# Patient Record
Sex: MALE | Race: WHITE | NOT HISPANIC OR LATINO | Employment: FULL TIME | URBAN - METROPOLITAN AREA
[De-identification: names, ages, dates, MRNs, and addresses within clinical notes are randomized per-mention and may not be internally consistent; named-entity substitution may affect disease eponyms.]

---

## 2023-03-24 ENCOUNTER — TELEPHONE (OUTPATIENT)
Dept: PRIMARY CARE | Facility: CLINIC | Age: 68
End: 2023-03-24
Payer: MEDICARE

## 2023-03-24 NOTE — TELEPHONE ENCOUNTER
Patient's endocrinologist retired his next appointment with new endo doctor is in July.    Patient needs glucose monitor reordered asking if Dr. Irwin would be willing to do this until he is reestablished     Please call patient [phone]  [facility] [phone]

## 2023-04-11 PROBLEM — M17.0 ARTHRITIS OF BOTH KNEES: Status: ACTIVE | Noted: 2023-04-11

## 2023-04-11 PROBLEM — E66.3 OVERWEIGHT WITH BODY MASS INDEX (BMI) 25.0-29.9: Status: ACTIVE | Noted: 2023-04-11

## 2023-04-11 PROBLEM — M25.569 PAIN IN JOINT, LOWER LEG: Status: ACTIVE | Noted: 2023-04-11

## 2023-04-11 PROBLEM — E11.9 DIABETES MELLITUS TYPE 2 WITHOUT RETINOPATHY (MULTI): Status: ACTIVE | Noted: 2023-04-11

## 2023-04-11 PROBLEM — M10.9 GOUT: Status: ACTIVE | Noted: 2023-04-11

## 2023-04-11 PROBLEM — H52.209 HYPEROPIA WITH ASTIGMATISM AND PRESBYOPIA: Status: ACTIVE | Noted: 2023-04-11

## 2023-04-11 PROBLEM — H52.00 HYPEROPIA WITH ASTIGMATISM AND PRESBYOPIA: Status: ACTIVE | Noted: 2023-04-11

## 2023-04-11 PROBLEM — H35.033 BILATERAL HYPERTENSIVE RETINOPATHY: Status: ACTIVE | Noted: 2023-04-11

## 2023-04-11 PROBLEM — R80.8 NEPHROGENOUS PROTEINURIA: Status: ACTIVE | Noted: 2023-04-11

## 2023-04-11 PROBLEM — J06.9 ACUTE UPPER RESPIRATORY INFECTION: Status: ACTIVE | Noted: 2023-04-11

## 2023-04-11 PROBLEM — I10 HYPERTENSION: Status: ACTIVE | Noted: 2023-04-11

## 2023-04-11 PROBLEM — H25.13 CATARACT, NUCLEAR SCLEROTIC, BOTH EYES: Status: ACTIVE | Noted: 2023-04-11

## 2023-04-11 PROBLEM — J01.90 ACUTE SINUSITIS: Status: ACTIVE | Noted: 2023-04-11

## 2023-04-11 PROBLEM — N40.0 BENIGN ENLARGEMENT OF PROSTATE: Status: ACTIVE | Noted: 2023-04-11

## 2023-04-11 PROBLEM — I35.0 AORTIC STENOSIS, MILD: Status: ACTIVE | Noted: 2023-04-11

## 2023-04-11 PROBLEM — K44.9 HIATAL HERNIA: Status: ACTIVE | Noted: 2023-04-11

## 2023-04-11 PROBLEM — G57.12 MERALGIA PARESTHETICA OF LEFT SIDE: Status: ACTIVE | Noted: 2023-04-11

## 2023-04-11 PROBLEM — H25.11 AGE-RELATED NUCLEAR CATARACT OF RIGHT EYE: Status: ACTIVE | Noted: 2023-04-11

## 2023-04-11 PROBLEM — J30.2 SEASONAL ALLERGIES: Status: ACTIVE | Noted: 2023-04-11

## 2023-04-11 PROBLEM — L03.115 CELLULITIS OF RIGHT LOWER EXTREMITY: Status: ACTIVE | Noted: 2023-04-11

## 2023-04-11 PROBLEM — M47.892 OTHER SPONDYLOSIS, CERVICAL REGION: Status: ACTIVE | Noted: 2023-04-11

## 2023-04-11 PROBLEM — H52.4 HYPEROPIA WITH ASTIGMATISM AND PRESBYOPIA: Status: ACTIVE | Noted: 2023-04-11

## 2023-04-11 PROBLEM — N52.9 MALE ERECTILE DISORDER OF ORGANIC ORIGIN: Status: ACTIVE | Noted: 2023-04-11

## 2023-04-11 PROBLEM — E11.9 DIABETES MELLITUS (MULTI): Status: ACTIVE | Noted: 2023-04-11

## 2023-04-11 PROBLEM — R04.0 EPISTAXIS: Status: ACTIVE | Noted: 2023-04-11

## 2023-04-11 PROBLEM — N28.1 CYST, KIDNEY, ACQUIRED: Status: ACTIVE | Noted: 2023-04-11

## 2023-04-11 PROBLEM — N05.1 FSGS (FOCAL SEGMENTAL GLOMERULOSCLEROSIS): Status: ACTIVE | Noted: 2023-04-11

## 2023-04-11 PROBLEM — K21.9 ESOPHAGEAL REFLUX: Status: ACTIVE | Noted: 2023-04-11

## 2023-04-11 PROBLEM — M70.20 OLECRANON BURSITIS: Status: ACTIVE | Noted: 2023-04-11

## 2023-04-11 PROBLEM — K57.90 DIVERTICULOSIS: Status: ACTIVE | Noted: 2023-04-11

## 2023-04-11 PROBLEM — G47.33 OBSTRUCTIVE SLEEP APNEA ON CPAP: Status: ACTIVE | Noted: 2023-04-11

## 2023-04-11 PROBLEM — E78.5 HYPERLIPIDEMIA: Status: ACTIVE | Noted: 2023-04-11

## 2023-04-11 PROBLEM — H25.12 AGE-RELATED NUCLEAR CATARACT OF LEFT EYE: Status: ACTIVE | Noted: 2023-04-11

## 2023-04-11 PROBLEM — U07.1 COVID-19 VIRUS DETECTED: Status: ACTIVE | Noted: 2023-04-11

## 2023-04-11 PROBLEM — H02.889 MGD (MEIBOMIAN GLAND DISEASE): Status: ACTIVE | Noted: 2023-04-11

## 2023-04-11 RX ORDER — ERGOCALCIFEROL 1.25 MG/1
1 CAPSULE ORAL
COMMUNITY
Start: 2023-02-15

## 2023-04-11 RX ORDER — AMLODIPINE BESYLATE 5 MG/1
1 TABLET ORAL DAILY
COMMUNITY
Start: 2023-02-21 | End: 2023-10-18 | Stop reason: WASHOUT

## 2023-04-11 RX ORDER — METFORMIN HYDROCHLORIDE 500 MG/1
1000 TABLET ORAL
COMMUNITY
End: 2023-11-07 | Stop reason: SDUPTHER

## 2023-04-11 RX ORDER — CHLORTHALIDONE 25 MG/1
1 TABLET ORAL DAILY
COMMUNITY

## 2023-04-11 RX ORDER — AMLODIPINE BESYLATE 10 MG/1
1 TABLET ORAL DAILY
COMMUNITY
End: 2023-04-12 | Stop reason: ALTCHOICE

## 2023-04-11 RX ORDER — CYCLOBENZAPRINE HCL 10 MG
TABLET ORAL EVERY 8 HOURS PRN
COMMUNITY
Start: 2022-03-15

## 2023-04-11 RX ORDER — AMOXICILLIN 500 MG/1
CAPSULE ORAL
COMMUNITY
Start: 2022-10-27 | End: 2024-04-25 | Stop reason: ALTCHOICE

## 2023-04-11 RX ORDER — BLOOD SUGAR DIAGNOSTIC
STRIP MISCELLANEOUS
COMMUNITY
Start: 2016-08-17

## 2023-04-11 RX ORDER — SIMVASTATIN 80 MG/1
TABLET, FILM COATED ORAL
COMMUNITY
End: 2023-10-17

## 2023-04-11 RX ORDER — LISINOPRIL 20 MG/1
20 TABLET ORAL DAILY
COMMUNITY
End: 2023-10-18 | Stop reason: ALTCHOICE

## 2023-04-11 RX ORDER — ASPIRIN 81 MG/1
TABLET ORAL
COMMUNITY
Start: 2016-11-02 | End: 2023-04-13 | Stop reason: DRUGHIGH

## 2023-04-11 RX ORDER — INSULIN GLARGINE 100 [IU]/ML
INJECTION, SOLUTION SUBCUTANEOUS
COMMUNITY

## 2023-04-12 ENCOUNTER — OFFICE VISIT (OUTPATIENT)
Dept: PRIMARY CARE | Facility: CLINIC | Age: 68
End: 2023-04-12
Payer: MEDICARE

## 2023-04-12 VITALS
WEIGHT: 186 LBS | SYSTOLIC BLOOD PRESSURE: 118 MMHG | HEART RATE: 76 BPM | OXYGEN SATURATION: 98 % | DIASTOLIC BLOOD PRESSURE: 71 MMHG | BODY MASS INDEX: 27.07 KG/M2 | TEMPERATURE: 98.2 F | RESPIRATION RATE: 16 BRPM

## 2023-04-12 DIAGNOSIS — D68.51 HOMOZYGOUS FACTOR V LEIDEN MUTATION (MULTI): Chronic | ICD-10-CM

## 2023-04-12 DIAGNOSIS — I10 PRIMARY HYPERTENSION: Chronic | ICD-10-CM

## 2023-04-12 DIAGNOSIS — J30.2 SEASONAL ALLERGIES: Chronic | ICD-10-CM

## 2023-04-12 DIAGNOSIS — G47.30 SLEEP APNEA, UNSPECIFIED TYPE: Chronic | ICD-10-CM

## 2023-04-12 DIAGNOSIS — M25.562 CHRONIC PAIN OF LEFT KNEE: Chronic | ICD-10-CM

## 2023-04-12 DIAGNOSIS — N18.32: Chronic | ICD-10-CM

## 2023-04-12 DIAGNOSIS — E13.22: Chronic | ICD-10-CM

## 2023-04-12 DIAGNOSIS — G47.33 OBSTRUCTIVE SLEEP APNEA ON CPAP: Chronic | ICD-10-CM

## 2023-04-12 DIAGNOSIS — H25.9 AGE-RELATED CATARACT OF BOTH EYES, UNSPECIFIED AGE-RELATED CATARACT TYPE: Chronic | ICD-10-CM

## 2023-04-12 DIAGNOSIS — G89.29 CHRONIC PAIN OF LEFT KNEE: Chronic | ICD-10-CM

## 2023-04-12 DIAGNOSIS — N40.1 BPH ASSOCIATED WITH NOCTURIA: Chronic | ICD-10-CM

## 2023-04-12 DIAGNOSIS — Z00.00 ROUTINE GENERAL MEDICAL EXAMINATION AT HEALTH CARE FACILITY: Primary | ICD-10-CM

## 2023-04-12 DIAGNOSIS — Z13.6 SCREENING FOR CARDIOVASCULAR CONDITION: ICD-10-CM

## 2023-04-12 DIAGNOSIS — R35.1 BPH ASSOCIATED WITH NOCTURIA: Chronic | ICD-10-CM

## 2023-04-12 DIAGNOSIS — R80.8 NEPHROGENOUS PROTEINURIA: Chronic | ICD-10-CM

## 2023-04-12 DIAGNOSIS — R35.1 BENIGN PROSTATIC HYPERPLASIA WITH NOCTURIA: ICD-10-CM

## 2023-04-12 DIAGNOSIS — N05.1 FSGS (FOCAL SEGMENTAL GLOMERULOSCLEROSIS): Chronic | ICD-10-CM

## 2023-04-12 DIAGNOSIS — E21.3 HYPERPARATHYROIDISM (MULTI): Chronic | ICD-10-CM

## 2023-04-12 DIAGNOSIS — Z71.85 IMMUNIZATION COUNSELING: ICD-10-CM

## 2023-04-12 DIAGNOSIS — Z79.4: Chronic | ICD-10-CM

## 2023-04-12 DIAGNOSIS — N40.1 BENIGN PROSTATIC HYPERPLASIA WITH NOCTURIA: ICD-10-CM

## 2023-04-12 DIAGNOSIS — E78.5 DYSLIPIDEMIA, GOAL LDL BELOW 70: Chronic | ICD-10-CM

## 2023-04-12 DIAGNOSIS — M1A.39X0 CHRONIC GOUT DUE TO RENAL IMPAIRMENT OF MULTIPLE SITES WITHOUT TOPHUS: Chronic | ICD-10-CM

## 2023-04-12 DIAGNOSIS — M77.11 EPICONDYLITIS, LATERAL, RIGHT: Chronic | ICD-10-CM

## 2023-04-12 PROCEDURE — 1036F TOBACCO NON-USER: CPT | Performed by: INTERNAL MEDICINE

## 2023-04-12 PROCEDURE — G0439 PPPS, SUBSEQ VISIT: HCPCS | Performed by: INTERNAL MEDICINE

## 2023-04-12 PROCEDURE — 3078F DIAST BP <80 MM HG: CPT | Performed by: INTERNAL MEDICINE

## 2023-04-12 PROCEDURE — 1170F FXNL STATUS ASSESSED: CPT | Performed by: INTERNAL MEDICINE

## 2023-04-12 PROCEDURE — 4010F ACE/ARB THERAPY RXD/TAKEN: CPT | Performed by: INTERNAL MEDICINE

## 2023-04-12 PROCEDURE — 1159F MED LIST DOCD IN RCRD: CPT | Performed by: INTERNAL MEDICINE

## 2023-04-12 PROCEDURE — G0009 ADMIN PNEUMOCOCCAL VACCINE: HCPCS | Performed by: INTERNAL MEDICINE

## 2023-04-12 PROCEDURE — 90677 PCV20 VACCINE IM: CPT | Performed by: INTERNAL MEDICINE

## 2023-04-12 PROCEDURE — 1160F RVW MEDS BY RX/DR IN RCRD: CPT | Performed by: INTERNAL MEDICINE

## 2023-04-12 PROCEDURE — 3066F NEPHROPATHY DOC TX: CPT | Performed by: INTERNAL MEDICINE

## 2023-04-12 PROCEDURE — 3074F SYST BP LT 130 MM HG: CPT | Performed by: INTERNAL MEDICINE

## 2023-04-12 PROCEDURE — 99397 PER PM REEVAL EST PAT 65+ YR: CPT | Performed by: INTERNAL MEDICINE

## 2023-04-12 RX ORDER — ALLOPURINOL 100 MG/1
100 TABLET ORAL DAILY
COMMUNITY

## 2023-04-12 ASSESSMENT — PATIENT HEALTH QUESTIONNAIRE - PHQ9
SUM OF ALL RESPONSES TO PHQ9 QUESTIONS 1 AND 2: 0
2. FEELING DOWN, DEPRESSED OR HOPELESS: NOT AT ALL
1. LITTLE INTEREST OR PLEASURE IN DOING THINGS: NOT AT ALL

## 2023-04-12 ASSESSMENT — ENCOUNTER SYMPTOMS
LOSS OF SENSATION IN FEET: 0
DEPRESSION: 0
OCCASIONAL FEELINGS OF UNSTEADINESS: 0

## 2023-04-12 NOTE — PROGRESS NOTES
Subjective   Patient ID: Daphne Andrew is a 67 y.o. male who presents for Welcome To [employer].    Here for wellness visit and follow-up  Overall doing well.  Left knee has been sore-he has been into his rheumatologist to get this injected  Right elbow sore recently         Review of Systems    Objective   /71 (BP Location: Left arm, Patient Position: Sitting)   Pulse 76   Temp 36.8 °C (98.2 °F)   Resp 16   Wt 84.4 kg (186 lb)   SpO2 98%   BMI 27.07 kg/m²     Physical Exam  Constitutional:       Appearance: Normal appearance.   HENT:      Head: Normocephalic and atraumatic.      Right Ear: Tympanic membrane normal.      Nose: Nose normal.   Eyes:      General: No scleral icterus.     Extraocular Movements: Extraocular movements intact.      Conjunctiva/sclera: Conjunctivae normal.      Pupils: Pupils are equal, round, and reactive to light.   Cardiovascular:      Rate and Rhythm: Normal rate and regular rhythm.      Pulses: Normal pulses.      Heart sounds: Murmur heard.   Pulmonary:      Effort: Pulmonary effort is normal. No respiratory distress.      Breath sounds: Normal breath sounds. No stridor. No wheezing.   Abdominal:      General: Abdomen is flat. Bowel sounds are normal. There is no distension.      Palpations: Abdomen is soft. There is no mass.      Tenderness: There is no abdominal tenderness. There is no guarding.   Musculoskeletal:         General: No swelling, tenderness or deformity. Normal range of motion.      Cervical back: Normal range of motion and neck supple. No tenderness.   Lymphadenopathy:      Cervical: No cervical adenopathy.   Skin:     General: Skin is warm and dry.      Findings: No lesion or rash.   Neurological:      General: No focal deficit present.      Mental Status: He is alert and oriented to person, place, and time.      Cranial Nerves: No cranial nerve deficit.      Motor: No weakness.   Psychiatric:         Mood and Affect: Mood normal.         Behavior: Behavior  normal.         Thought Content: Thought content normal.         Judgment: Judgment normal.         Assessment/Plan   Problem List Items Addressed This Visit          Nervous    Obstructive sleep apnea on CPAP       Circulatory    Hypertension       Genitourinary    Benign enlargement of prostate    FSGS (focal segmental glomerulosclerosis)       Endocrine/Metabolic    Diabetes mellitus (CMS/HCC)    Relevant Orders    Referral to Ophthalmology    Hyperparathyroidism (CMS/HCC)       Hematologic    Homozygous Factor V Leiden mutation (CMS/HCC)    Relevant Medications    aspirin 81 mg EC tablet       Other    Gout    Nephrogenous proteinuria    Seasonal allergies     Other Visit Diagnoses       Routine general medical examination at health care facility    -  Primary    Immunization counseling        Relevant Medications    zoster vaccine-recombinant adjuvanted (Shingrix) 50 mcg/0.5 mL vaccine    Other Relevant Orders    Pneumococcal conjugate vaccine, 20-valent, adult (PREVNAR 20) (Completed)    Age-related cataract of both eyes, unspecified age-related cataract type  (Chronic)       Relevant Orders    Referral to Ophthalmology    Sleep apnea, unspecified type  (Chronic)       Relevant Orders    Referral to Adult Sleep Medicine    Dyslipidemia, goal LDL below 70  (Chronic)       Relevant Orders    Lipid Panel    TSH with reflex to Free T4 if abnormal    Alanine Aminotransferase    BPH associated with nocturia  (Chronic)       Relevant Orders    Prostate Specific Antigen    Screening for cardiovascular condition        Chronic pain of left knee  (Chronic)       Epicondylitis, lateral, right  (Chronic)                    living situation - he lives in a 2 story home, w/ bedroom upstairs. His home in [place] has 5 stories, now w/ an elevator        Labile insulin-dependent diabetes/elevated weight-obviously weight loss important. He will follow-up with endocrinology.  Presently using the Dexicon 6 continuous glucose  monitoring-improved control with better diet selections with immediate feedback            He meets with Dr. Reid 7/12/2023 to establish with her.  Continuous glucose monitor supply form signed/faxed     elevated weight/dyslipidemia- he will continue his statin and weight loss efforts. HDL 41, LDL 97, total cholesterol 163 July '22.     Fasting labs ordered for this year            Hypertension/edema-he will continue his medication low-salt diet and weight loss efforts     Mild aortic stenosis- noted on echo '17; 2 year follow-up echo 11/19 stable will consider another echo in 2 years- 11/21        FSGS secondary- proteinuria- -status post renal biopsy-he will follow-up with Dr. Richard Pierre; recent visit OK he notes. No edema today.    Appointment in February 2022 with Dr. Richard Pierre- every 6 months- more frequent w/ renal lab concerns. Creatinine 1.9 in August 2021.             February 2023-creatinine 2.14.  He will continue to see him every 6 months-February and August    Hyperparathyroidism-secondary-he will continue to manage with his nephrologist    Vitamin D deficiency- encouraged patient to continue vitamin D daily as ordered. Will consider vitamin D level regularly.      Factor V Leiden homozygous trait-discovered after his son's death from DVTs and PE-coronary requested the entire family be screened for hereditary thrombophilia-patient saw Dr. Hutchinson 11/22-further testing revealed homozygous trait for factor V Leiden-baby aspirin daily recommended as well as consideration for anticoagulation after any elective surgeries        BPH/ED/renal cyst-status post urology consultation with Dr. Horne in the past. PSA normal Jul '22. Ordered for this summer     Colon cancer screening- colonoscopy Updated 11/13; next rec'd 10 yrs - 11/23     Bilateral moderate knee arthritis-noted on x-rays-January 2021- knee pain ongoing especially kneeling on his knees problematic presently.              He has worked with several  orthopedist.  Most recently he met with Dr. Martinez who did an injection in the left knee which helped    Right lateral epicondylitis-information sheet provided stretching and brace encouraged to follow-up if not improved     Right foot pain and swelling/Right ankle Charcot deformity-improved wearing a boot earlier in 2019 per Dr. Mayorga/podiatry. He will see her as needed Coincidentally, recurred again months later. His only doing better. Dr. Diane Mayorga/podiatry. He will elevate the foot continue limited activity and follow-up with her if symptoms persist               Improved presently     Epistaxis - improved     Obstructive sleep apnea on C Pap - He'll f/u w/ Dr. Johnson in pulmonary as directed. He admits he only does this about 2 nights/ wk.         He's not really using this at all. Encouraged follow-up.  He will meet with the sleep team     Acid reflux- . He's found esomeprazole helpful. However at this point he stopped this and presently uses Tums occasionally   Improved presently.        Seasonal allergies-he will continue seasonally as needed     Dental visit-encouraged semiannually.      Cataracts / Vision care-he will continue annually with eye clinic -last vision appointment Sep '21 per Dr. Beltran- he retired             He will establish with a new eye doctor at our eye clinic to check cataracts and do annual diabetic eye exam                 bereavement - Patient lost his youngest son on 6/5/22-bilateral DVTs and PE.  After further hematology testing of the whole family, it turns out that Coni was  homozygous for the factor V Leiden trait.     Flu shot encouraged each fall     Prevnar 13-updated 4/12/2023-today     discussed Shingrix / new shingles shot - 2 shot series w/ limited availability. Encouraged patient to consider getting this when/ where available.     Follow-up in semiannually, sooner as needed

## 2023-04-13 PROBLEM — E21.3 HYPERPARATHYROIDISM (MULTI): Status: ACTIVE | Noted: 2023-04-13

## 2023-04-13 PROBLEM — U07.1 COVID-19 VIRUS DETECTED: Status: RESOLVED | Noted: 2023-04-11 | Resolved: 2023-04-13

## 2023-04-13 PROBLEM — E55.9 VITAMIN D DEFICIENCY: Status: ACTIVE | Noted: 2023-04-13

## 2023-04-13 PROBLEM — N18.30 STAGE 3 CHRONIC KIDNEY DISEASE (MULTI): Status: ACTIVE | Noted: 2023-04-13

## 2023-04-13 RX ORDER — TADALAFIL 20 MG/1
20 TABLET ORAL DAILY PRN
COMMUNITY

## 2023-04-13 RX ORDER — INSULIN LISPRO 100 [IU]/ML
10 INJECTION, SUSPENSION SUBCUTANEOUS 3 TIMES DAILY PRN
COMMUNITY

## 2023-04-13 RX ORDER — CETIRIZINE HYDROCHLORIDE 10 MG/1
10 TABLET ORAL DAILY PRN
COMMUNITY

## 2023-04-13 RX ORDER — ASPIRIN 81 MG/1
81 TABLET ORAL DAILY
Status: SHIPPED | COMMUNITY
Start: 2023-04-13

## 2023-04-13 ASSESSMENT — ACTIVITIES OF DAILY LIVING (ADL)
ASSISTIVE_DEVICE: EYEGLASSES
TOILETING: INDEPENDENT
BATHING: INDEPENDENT
HEARING - RIGHT EAR: FUNCTIONAL
GROOMING: INDEPENDENT
ADEQUATE_TO_COMPLETE_ADL: YES
JUDGMENT_ADEQUATE_SAFELY_COMPLETE_DAILY_ACTIVITIES: YES
PATIENT'S MEMORY ADEQUATE TO SAFELY COMPLETE DAILY ACTIVITIES?: YES
HEARING - LEFT EAR: FUNCTIONAL
DRESSING YOURSELF: INDEPENDENT
WALKS IN HOME: INDEPENDENT
FEEDING YOURSELF: INDEPENDENT

## 2023-04-14 PROBLEM — D68.51 HOMOZYGOUS FACTOR V LEIDEN MUTATION (MULTI): Status: ACTIVE | Noted: 2023-04-14

## 2023-06-29 ENCOUNTER — TELEPHONE (OUTPATIENT)
Dept: PRIMARY CARE | Facility: CLINIC | Age: 68
End: 2023-06-29
Payer: MEDICARE

## 2023-06-29 NOTE — TELEPHONE ENCOUNTER
Pt was released [facility] yesterday and would like to be seen or talk to Dr. Irwin. Pt's wife advised he is still having stomach pains. Please call and advise

## 2023-06-30 ENCOUNTER — PATIENT OUTREACH (OUTPATIENT)
Dept: PRIMARY CARE | Facility: CLINIC | Age: 68
End: 2023-06-30
Payer: MEDICARE

## 2023-06-30 DIAGNOSIS — A04.5 CAMPYLOBACTER GASTROENTERITIS: ICD-10-CM

## 2023-06-30 NOTE — TELEPHONE ENCOUNTER
Called patient's phone number-as well as his wife's cell phone number-neither there.  [employer] message sent

## 2023-06-30 NOTE — PROGRESS NOTES
TCM complete.  Discharge date 6/28/23   2 attempts were made to reach patient to assess needs.   No return call as of this note.   If patient schedules follow up within 14 days of discharge, visit is TCM billable.  Message sent to practice clinical pool to reach out to patient and schedule an appointment within 7-13 days from discharge date.    If patient meets criteria for moderately complex & has follow-up within 14 days-can bill [id number].   If patient meets criteria for highly complex & has follow-up visit within 7 days-can bill [id number].    *virtual follow up needs modifier added (95 or GT)   *AWV AND TCM CAN BE BILLED TOGETHER WITH 25 MODIFIER

## 2023-07-10 NOTE — TELEPHONE ENCOUNTER
Called and spoke with patient who states that he feels much better.  He went to a wedding out of town and came back with an infection with his kidney.  He has been working with his kidney specialist and doing blood and urine test every 2 weeks just there several days ago.  No active signs of infection.  He feels well and at this point does not have any additional concerns.  He will follow-up with me as scheduled in October.  He will continue to follow with his nephrologist regularly with the labs every 2 weeks.  He will call with concerns otherwise follow-up as scheduled

## 2023-07-11 DIAGNOSIS — E11.9 DIABETES MELLITUS TYPE 2 WITHOUT RETINOPATHY (MULTI): Primary | ICD-10-CM

## 2023-07-12 LAB — HEMOGLOBIN A1C/HEMOGLOBIN TOTAL IN BLOOD: 6 %

## 2023-07-14 ENCOUNTER — PATIENT OUTREACH (OUTPATIENT)
Dept: PRIMARY CARE | Facility: CLINIC | Age: 68
End: 2023-07-14
Payer: MEDICARE

## 2023-07-14 NOTE — PROGRESS NOTES
Unable to reach patient for call back 14 days post discharge from the hospital.  M with call back number for patient to call if needed   If no voicemail available call attempts x 2 were made to contact the patient to assist with any questions or concerns patient may have. Patient did not follow up with their PCP within that time. Patient dis-enrolled from [facility] this date.

## 2023-09-05 PROBLEM — E86.1 HYPOVOLEMIA: Status: ACTIVE | Noted: 2023-09-05

## 2023-09-05 PROBLEM — N17.9 AKI (ACUTE KIDNEY INJURY) (CMS-HCC): Status: ACTIVE | Noted: 2023-09-05

## 2023-09-05 PROBLEM — E79.0 HYPERURICEMIA: Status: ACTIVE | Noted: 2023-09-05

## 2023-09-05 PROBLEM — M25.562 CHRONIC PAIN OF BOTH KNEES: Status: ACTIVE | Noted: 2023-09-05

## 2023-09-05 PROBLEM — R19.7 WATERY DIARRHEA: Status: ACTIVE | Noted: 2023-09-05

## 2023-09-05 PROBLEM — A04.5 CAMPYLOBACTER GASTROENTERITIS: Status: ACTIVE | Noted: 2023-09-05

## 2023-09-05 PROBLEM — M25.561 CHRONIC PAIN OF BOTH KNEES: Status: ACTIVE | Noted: 2023-09-05

## 2023-09-05 PROBLEM — G89.29 CHRONIC PAIN OF BOTH KNEES: Status: ACTIVE | Noted: 2023-09-05

## 2023-09-05 RX ORDER — ACETAMINOPHEN 325 MG/1
650 TABLET ORAL
COMMUNITY

## 2023-09-05 RX ORDER — AMLODIPINE BESYLATE 10 MG/1
1 TABLET ORAL DAILY
COMMUNITY
End: 2024-02-07 | Stop reason: WASHOUT

## 2023-09-05 RX ORDER — METHYLPREDNISOLONE 4 MG/1
TABLET ORAL
COMMUNITY
Start: 2023-07-05 | End: 2024-04-25 | Stop reason: ALTCHOICE

## 2023-09-05 RX ORDER — LISINOPRIL 40 MG/1
40 TABLET ORAL DAILY
COMMUNITY
End: 2024-02-07 | Stop reason: WASHOUT

## 2023-09-05 RX ORDER — METFORMIN HYDROCHLORIDE 1000 MG/1
1000 TABLET ORAL
COMMUNITY
End: 2023-11-07 | Stop reason: WASHOUT

## 2023-09-05 RX ORDER — ASPIRIN 325 MG
50000 TABLET, DELAYED RELEASE (ENTERIC COATED) ORAL
COMMUNITY

## 2023-09-05 RX ORDER — PEN NEEDLE, DIABETIC 30 GX3/16"
NEEDLE, DISPOSABLE MISCELLANEOUS DAILY
COMMUNITY

## 2023-09-05 RX ORDER — DULAGLUTIDE 0.75 MG/.5ML
0.75 INJECTION, SOLUTION SUBCUTANEOUS
COMMUNITY
Start: 2023-07-12 | End: 2023-10-02 | Stop reason: SDUPTHER

## 2023-10-02 ENCOUNTER — TELEPHONE (OUTPATIENT)
Dept: ENDOCRINOLOGY | Facility: CLINIC | Age: 68
End: 2023-10-02
Payer: MEDICARE

## 2023-10-02 DIAGNOSIS — E11.9 DIABETES MELLITUS TYPE 2 WITHOUT RETINOPATHY (MULTI): Primary | ICD-10-CM

## 2023-10-02 RX ORDER — DULAGLUTIDE 0.75 MG/.5ML
0.75 INJECTION, SOLUTION SUBCUTANEOUS
Qty: 2 ML | Refills: 0 | Status: SHIPPED | OUTPATIENT
Start: 2023-10-02 | End: 2023-11-06

## 2023-10-11 ENCOUNTER — OFFICE VISIT (OUTPATIENT)
Dept: ENDOCRINOLOGY | Facility: CLINIC | Age: 68
End: 2023-10-11
Payer: MEDICARE

## 2023-10-11 VITALS
BODY MASS INDEX: 28.53 KG/M2 | SYSTOLIC BLOOD PRESSURE: 139 MMHG | RESPIRATION RATE: 16 BRPM | HEART RATE: 66 BPM | WEIGHT: 192.6 LBS | DIASTOLIC BLOOD PRESSURE: 84 MMHG | HEIGHT: 69 IN

## 2023-10-11 DIAGNOSIS — E11.9 DIABETES MELLITUS TYPE 2 WITHOUT RETINOPATHY (MULTI): ICD-10-CM

## 2023-10-11 DIAGNOSIS — Z97.8 USES SELF-APPLIED CONTINUOUS GLUCOSE MONITORING DEVICE: Primary | ICD-10-CM

## 2023-10-11 DIAGNOSIS — N18.31 STAGE 3A CHRONIC KIDNEY DISEASE (MULTI): ICD-10-CM

## 2023-10-11 LAB
POC FINGERSTICK BLOOD GLUCOSE: 142 MG/DL (ref 70–100)
POC HEMOGLOBIN A1C: 6.2 % (ref 4.2–6.5)

## 2023-10-11 PROCEDURE — 1036F TOBACCO NON-USER: CPT | Performed by: STUDENT IN AN ORGANIZED HEALTH CARE EDUCATION/TRAINING PROGRAM

## 2023-10-11 PROCEDURE — 3044F HG A1C LEVEL LT 7.0%: CPT | Performed by: STUDENT IN AN ORGANIZED HEALTH CARE EDUCATION/TRAINING PROGRAM

## 2023-10-11 PROCEDURE — 1160F RVW MEDS BY RX/DR IN RCRD: CPT | Performed by: STUDENT IN AN ORGANIZED HEALTH CARE EDUCATION/TRAINING PROGRAM

## 2023-10-11 PROCEDURE — 3066F NEPHROPATHY DOC TX: CPT | Performed by: STUDENT IN AN ORGANIZED HEALTH CARE EDUCATION/TRAINING PROGRAM

## 2023-10-11 PROCEDURE — 3075F SYST BP GE 130 - 139MM HG: CPT | Performed by: STUDENT IN AN ORGANIZED HEALTH CARE EDUCATION/TRAINING PROGRAM

## 2023-10-11 PROCEDURE — 83036 HEMOGLOBIN GLYCOSYLATED A1C: CPT | Performed by: STUDENT IN AN ORGANIZED HEALTH CARE EDUCATION/TRAINING PROGRAM

## 2023-10-11 PROCEDURE — 82962 GLUCOSE BLOOD TEST: CPT | Performed by: STUDENT IN AN ORGANIZED HEALTH CARE EDUCATION/TRAINING PROGRAM

## 2023-10-11 PROCEDURE — 4010F ACE/ARB THERAPY RXD/TAKEN: CPT | Performed by: STUDENT IN AN ORGANIZED HEALTH CARE EDUCATION/TRAINING PROGRAM

## 2023-10-11 PROCEDURE — 3079F DIAST BP 80-89 MM HG: CPT | Performed by: STUDENT IN AN ORGANIZED HEALTH CARE EDUCATION/TRAINING PROGRAM

## 2023-10-11 PROCEDURE — 95251 CONT GLUC MNTR ANALYSIS I&R: CPT | Performed by: STUDENT IN AN ORGANIZED HEALTH CARE EDUCATION/TRAINING PROGRAM

## 2023-10-11 PROCEDURE — 99214 OFFICE O/P EST MOD 30 MIN: CPT | Performed by: STUDENT IN AN ORGANIZED HEALTH CARE EDUCATION/TRAINING PROGRAM

## 2023-10-11 PROCEDURE — 1159F MED LIST DOCD IN RCRD: CPT | Performed by: STUDENT IN AN ORGANIZED HEALTH CARE EDUCATION/TRAINING PROGRAM

## 2023-10-11 ASSESSMENT — ENCOUNTER SYMPTOMS: CONSTITUTIONAL NEGATIVE: 1

## 2023-10-11 NOTE — PROGRESS NOTES
Subjective   Patient ID: Daphne Andrew is a 68 y.o. male who presents for for DM follow up    Follow up for diabetes   Last glucose on 6/28 was 180  Todays Glucose 142  Last A1c on 6/28 6.0  Todays A1c 6.2    Dexcom downloads reviewed  Range 83 % , high 16 % , very high <1   He is now off lantus   He just returned from oversees trip    Review of Systems   Constitutional: Negative.        Objective   Physical Exam  Constitutional:       Appearance: Normal appearance.   Cardiovascular:      Rate and Rhythm: Normal rate and regular rhythm.   Pulmonary:      Effort: Pulmonary effort is normal.      Breath sounds: Normal breath sounds.   Neurological:      General: No focal deficit present.      Mental Status: He is alert.         Assessment/Plan     -Well-controlled type 2 diabetes with A1c of 6.2  on metformin and GLP1 . He weaned off lantus after starting GLP1    -DM nephropathy follows with Dr. Ramirez  -no DM neuropathy   -follows with opthalmology yearly.    plan :  Continue  Trulicity 0.75 mg .( Mounjaro sample given 2.5 mg )    Continue metformin 500 mg twice daily ( maximum dose with CKD)  Continue CGM monitor      RTC in 3-4 months

## 2023-10-17 DIAGNOSIS — E78.5 DYSLIPIDEMIA, GOAL LDL BELOW 70: Primary | ICD-10-CM

## 2023-10-17 RX ORDER — SIMVASTATIN 80 MG/1
80 TABLET, FILM COATED ORAL NIGHTLY
Qty: 90 TABLET | Refills: 3 | Status: SHIPPED | OUTPATIENT
Start: 2023-10-17 | End: 2024-10-16

## 2023-10-18 ENCOUNTER — OFFICE VISIT (OUTPATIENT)
Dept: PRIMARY CARE | Facility: CLINIC | Age: 68
End: 2023-10-18
Payer: MEDICARE

## 2023-10-18 DIAGNOSIS — Z23 NEED FOR INFLUENZA VACCINATION: ICD-10-CM

## 2023-10-18 DIAGNOSIS — K21.9 GASTROESOPHAGEAL REFLUX DISEASE WITHOUT ESOPHAGITIS: ICD-10-CM

## 2023-10-18 DIAGNOSIS — Z12.11 COLON CANCER SCREENING: Primary | ICD-10-CM

## 2023-10-18 DIAGNOSIS — I10 PRIMARY HYPERTENSION: ICD-10-CM

## 2023-10-18 PROCEDURE — 1036F TOBACCO NON-USER: CPT | Performed by: INTERNAL MEDICINE

## 2023-10-18 PROCEDURE — 3075F SYST BP GE 130 - 139MM HG: CPT | Performed by: INTERNAL MEDICINE

## 2023-10-18 PROCEDURE — 3066F NEPHROPATHY DOC TX: CPT | Performed by: INTERNAL MEDICINE

## 2023-10-18 PROCEDURE — G0008 ADMIN INFLUENZA VIRUS VAC: HCPCS | Performed by: INTERNAL MEDICINE

## 2023-10-18 PROCEDURE — 1160F RVW MEDS BY RX/DR IN RCRD: CPT | Performed by: INTERNAL MEDICINE

## 2023-10-18 PROCEDURE — 3044F HG A1C LEVEL LT 7.0%: CPT | Performed by: INTERNAL MEDICINE

## 2023-10-18 PROCEDURE — 99214 OFFICE O/P EST MOD 30 MIN: CPT | Performed by: INTERNAL MEDICINE

## 2023-10-18 PROCEDURE — 3079F DIAST BP 80-89 MM HG: CPT | Performed by: INTERNAL MEDICINE

## 2023-10-18 PROCEDURE — 90662 IIV NO PRSV INCREASED AG IM: CPT | Performed by: INTERNAL MEDICINE

## 2023-10-18 PROCEDURE — 4010F ACE/ARB THERAPY RXD/TAKEN: CPT | Performed by: INTERNAL MEDICINE

## 2023-10-18 PROCEDURE — 1159F MED LIST DOCD IN RCRD: CPT | Performed by: INTERNAL MEDICINE

## 2023-10-18 RX ORDER — METOPROLOL SUCCINATE 50 MG/1
50 TABLET, EXTENDED RELEASE ORAL DAILY
Qty: 90 TABLET | Refills: 3 | Status: SHIPPED | OUTPATIENT
Start: 2023-10-18

## 2023-10-18 RX ORDER — FAMOTIDINE 40 MG/1
40 TABLET, FILM COATED ORAL DAILY PRN
Qty: 90 TABLET | Refills: 3 | Status: SHIPPED | OUTPATIENT
Start: 2023-10-18 | End: 2024-10-12

## 2023-10-18 ASSESSMENT — PATIENT HEALTH QUESTIONNAIRE - PHQ9
1. LITTLE INTEREST OR PLEASURE IN DOING THINGS: NOT AT ALL
SUM OF ALL RESPONSES TO PHQ9 QUESTIONS 1 AND 2: 0
2. FEELING DOWN, DEPRESSED OR HOPELESS: NOT AT ALL

## 2023-10-18 ASSESSMENT — ENCOUNTER SYMPTOMS
LOSS OF SENSATION IN FEET: 0
OCCASIONAL FEELINGS OF UNSTEADINESS: 0
DEPRESSION: 0

## 2023-10-18 NOTE — PROGRESS NOTES
"Subjective   Patient ID: Daphne Anderw is a 68 y.o. male who presents for Follow-up.    Here for follow-up.  For the most part doing well.  Left knee has been a bit sore-recently injected    Planning to see his nephrologist November 1 with labs before.  He did not do any labs before this visit.    No exertional chest pain, palpitations, dizziness, orthopnea or pedal edema.    He was able to go to see his family in [place] earlier this summer.  It was a good month he states         Review of Systems    Objective   /89 (BP Location: Left arm, Patient Position: Sitting, BP Cuff Size: Adult)   Pulse 64   Temp 36.6 °C (97.9 °F)   Resp 16   Ht 1.753 m (5' 9\")   Wt 86.2 kg (190 lb)   SpO2 99%   BMI 28.06 kg/m²     Physical Exam  Vitals reviewed.   Constitutional:       Appearance: Normal appearance.   HENT:      Head: Normocephalic and atraumatic.   Eyes:      General: No scleral icterus.        Right eye: No discharge.         Left eye: No discharge.      Extraocular Movements: Extraocular movements intact.      Conjunctiva/sclera: Conjunctivae normal.      Pupils: Pupils are equal, round, and reactive to light.   Cardiovascular:      Rate and Rhythm: Normal rate and regular rhythm.      Pulses: Normal pulses.      Heart sounds: Murmur heard.   Pulmonary:      Effort: Pulmonary effort is normal.      Breath sounds: Normal breath sounds. No wheezing or rhonchi.   Musculoskeletal:         General: No deformity or signs of injury. Normal range of motion.      Cervical back: Normal range of motion and neck supple. No rigidity or tenderness.      Right lower leg: Edema present.      Left lower leg: Edema present.   Lymphadenopathy:      Cervical: No cervical adenopathy.   Skin:     General: Skin is warm and dry.      Findings: No rash.   Neurological:      General: No focal deficit present.      Mental Status: He is alert and oriented to person, place, and time. Mental status is at baseline.      Cranial Nerves: " No cranial nerve deficit.      Sensory: No sensory deficit.      Gait: Gait normal.   Psychiatric:         Mood and Affect: Mood normal.         Behavior: Behavior normal.         Thought Content: Thought content normal.         Judgment: Judgment normal.         Assessment/Plan   Problem List Items Addressed This Visit    None  Visit Diagnoses         Codes    Need for influenza vaccination     Z23    Relevant Orders    Flu vaccine, quadrivalent, high-dose, preservative free, age 65y+ (FLUZONE)             living situation - he lives in a 2 story home, w/ bedroom upstairs. His home in [place] has 5 stories, now w/ an elevator        Labile insulin-dependent diabetes/elevated weight-obviously weight loss important. He will follow-up with endocrinology.  Presently using the Dexicon 6 continuous glucose monitoring-improved control with better diet selections with immediate feedback            Met with Dr. Reid in early Oct.  Continuous glucose monitor helpful.      elevated weight/dyslipidemia- he will continue his statin and weight loss efforts. HDL 41, LDL 97, total cholesterol 163 July '22.               Fasting labs ordered for this year.  He will do soon.            Hypertension/edema-he will continue his medication low-salt diet and weight loss efforts             10/23-systolic pressure elevated.  We will start low-dose metoprolol and review with his nephrologist at his appointment 11/1/2023.     Mild aortic stenosis- noted on echo '17; 2 year follow-up echo 11/19 stable           will consider another echo in 2 years- 11/21        FSGS secondary- proteinuria- -status post renal biopsy-he will follow-up with Dr. Richard Pierre; recent visit OK he notes. No edema today.    Appointment in February 2022 with Dr. Richard Pierre- every 6 months- more frequent w/ renal lab concerns. Creatinine 1.9 in August 2021.             February 2023-creatinine 2.14.  He will continue to see him every 6 months; upcoming appointment  11/1/2023    Hyperparathyroidism-secondary-he will continue to manage with his nephrologist    Vitamin D deficiency- encouraged patient to continue vitamin D daily as ordered. Will consider vitamin D level regularly.      Factor V Leiden homozygous trait-discovered after his son's death from DVTs and PE-coronary requested the entire family be screened for hereditary thrombophilia-patient saw Dr. Hutchinson 11/22-further testing revealed homozygous trait for factor V Leiden-baby aspirin daily recommended as well as consideration for anticoagulation after any elective surgeries        BPH/ED/renal cyst-status post urology consultation with Dr. Horne in the past. PSA normal Jul '22. Ordered for this summer              PSA not yet done-he will do labs soon     Colon cancer screening- colonoscopy Updated 11/13; next rec'd 10 yrs - 11/23            Screening colonoscopy ordered     Bilateral moderate knee arthritis-noted on x-rays-January 2021- knee pain ongoing especially kneeling on his knees problematic presently.      He has worked with several orthopedist.  Most recently he met with Dr. Martinez who did an injection in the left knee which helped.            10/23-left knee recently injected-improved he will follow-up with Ortho accordingly as needed    Right lateral epicondylitis-information sheet provided stretching and brace encouraged to follow-up if not improved     Right foot pain and swelling/Right ankle Charcot deformity-improved wearing a boot earlier in 2019 per Dr. Mayorga/podiatry. He will see her as needed Coincidentally, recurred again months later. His only doing better. Dr. Diane Mayorga/podiatry. He will elevate the foot continue limited activity and follow-up with her if symptoms persist               Improved presently     Epistaxis - improved     Obstructive sleep apnea on C Pap - He'll f/u w/ Dr. Johnson in pulmonary as directed. He admits he only does this about 2 nights/ wk.         He's not  really using this at all. Encouraged follow-up.  He will meet with the sleep team     Acid reflux- . He's found esomeprazole helpful. However at this point he stopped this and presently uses Tums occasionally   Improved presently.              Reflux has been a bit of an issue.  He will use famotidine as needed.        Seasonal allergies-he will continue seasonally as needed     Dental visit-encouraged semiannually.      Cataracts / Vision care-he will continue annually with eye clinic -last vision appointment Sep '21 per Dr. Beltran- he retired             He will establish with a new eye doctor at our eye clinic to check cataracts and do annual diabetic eye exam                 bereavement - Patient lost his youngest son on 6/5/22-bilateral DVTs and PE.  After further hematology testing of the whole family, it turns out that Coni was  homozygous for the factor V Leiden trait.     Flu shot encouraged each fall - updated 10/18/23-today     Prevnar 13-updated 4/12/2023     discussed Shingrix / new shingles shot - 2 shot series w/ limited availability. Encouraged patient to consider getting this when/ where available.     Follow-up in semiannually, sooner as needed

## 2023-10-19 VITALS
TEMPERATURE: 97.9 F | HEIGHT: 69 IN | WEIGHT: 190 LBS | BODY MASS INDEX: 28.14 KG/M2 | RESPIRATION RATE: 16 BRPM | SYSTOLIC BLOOD PRESSURE: 136 MMHG | HEART RATE: 64 BPM | OXYGEN SATURATION: 99 % | DIASTOLIC BLOOD PRESSURE: 82 MMHG

## 2023-11-03 DIAGNOSIS — E11.9 DIABETES MELLITUS TYPE 2 WITHOUT RETINOPATHY (MULTI): ICD-10-CM

## 2023-11-03 NOTE — TELEPHONE ENCOUNTER
Coni called Friday looking for a refill on his Metformin 500 mg he takes  2 tabs twice a day.  Please send to his local [facility].

## 2023-11-06 RX ORDER — DULAGLUTIDE 0.75 MG/.5ML
INJECTION, SOLUTION SUBCUTANEOUS
Qty: 2 ML | Refills: 0 | Status: SHIPPED | OUTPATIENT
Start: 2023-11-06 | End: 2023-12-27 | Stop reason: SDUPTHER

## 2023-11-07 ENCOUNTER — TELEPHONE (OUTPATIENT)
Dept: ENDOCRINOLOGY | Facility: CLINIC | Age: 68
End: 2023-11-07
Payer: MEDICARE

## 2023-11-07 DIAGNOSIS — E11.9 DIABETES MELLITUS TYPE 2 WITHOUT RETINOPATHY (MULTI): ICD-10-CM

## 2023-11-07 DIAGNOSIS — E11.9 DIABETES MELLITUS TYPE 2 WITHOUT RETINOPATHY (MULTI): Primary | ICD-10-CM

## 2023-11-07 RX ORDER — METFORMIN HYDROCHLORIDE 500 MG/1
500 TABLET ORAL
Qty: 180 TABLET | Refills: 1 | Status: SHIPPED | OUTPATIENT
Start: 2023-11-07 | End: 2024-04-25 | Stop reason: WASHOUT

## 2023-11-07 RX ORDER — METFORMIN HYDROCHLORIDE 500 MG/1
500 TABLET ORAL
Qty: 180 TABLET | Refills: 3 | Status: CANCELLED | OUTPATIENT
Start: 2023-11-07

## 2023-11-07 NOTE — TELEPHONE ENCOUNTER
Daphne called for a refill on his Metformin 500 1 tab 2 x every day.  Please send to his local [facility].  
FAMILY HISTORY:  FH: lung cancer, FATHER    Sibling  Still living? Yes, Estimated age: Age Unknown  Family history of breast cancer, Age at diagnosis: Age Unknown

## 2023-11-30 ENCOUNTER — TELEPHONE (OUTPATIENT)
Dept: ENDOCRINOLOGY | Facility: CLINIC | Age: 68
End: 2023-11-30
Payer: MEDICARE

## 2023-11-30 NOTE — TELEPHONE ENCOUNTER
This patient called in today to let you know that his Nephrologist Dr Richard Pierre has taken him off of his Metformin.  He is looking for directives? February 7,2024 is his next appt with you.

## 2023-12-26 ENCOUNTER — TELEPHONE (OUTPATIENT)
Dept: ENDOCRINOLOGY | Facility: CLINIC | Age: 68
End: 2023-12-26
Payer: MEDICARE

## 2023-12-26 DIAGNOSIS — E11.9 DIABETES MELLITUS TYPE 2 WITHOUT RETINOPATHY (MULTI): ICD-10-CM

## 2023-12-26 NOTE — TELEPHONE ENCOUNTER
Daphne called for a refill on his Trulicity .75 injects once a week to be sent to  His local [facility].

## 2023-12-27 RX ORDER — DULAGLUTIDE 0.75 MG/.5ML
INJECTION, SOLUTION SUBCUTANEOUS
Qty: 6 ML | Refills: 1 | Status: SHIPPED | OUTPATIENT
Start: 2023-12-27 | End: 2024-04-25 | Stop reason: WASHOUT

## 2024-01-02 ENCOUNTER — APPOINTMENT (OUTPATIENT)
Dept: OPHTHALMOLOGY | Facility: CLINIC | Age: 69
End: 2024-01-02
Payer: MEDICARE

## 2024-01-02 ENCOUNTER — OFFICE VISIT (OUTPATIENT)
Dept: OPHTHALMOLOGY | Facility: CLINIC | Age: 69
End: 2024-01-02
Payer: MEDICARE

## 2024-01-02 DIAGNOSIS — H52.03 HYPERMETROPIA OF BOTH EYES: Primary | ICD-10-CM

## 2024-01-02 DIAGNOSIS — H52.4 PRESBYOPIA: ICD-10-CM

## 2024-01-02 DIAGNOSIS — H52.223 REGULAR ASTIGMATISM OF BOTH EYES: ICD-10-CM

## 2024-01-02 DIAGNOSIS — E11.9 TYPE 2 DIABETES MELLITUS WITHOUT COMPLICATION, WITHOUT LONG-TERM CURRENT USE OF INSULIN (MULTI): ICD-10-CM

## 2024-01-02 PROCEDURE — 92015 DETERMINE REFRACTIVE STATE: CPT | Performed by: OPTOMETRIST

## 2024-01-02 PROCEDURE — 92004 COMPRE OPH EXAM NEW PT 1/>: CPT | Performed by: OPTOMETRIST

## 2024-01-02 ASSESSMENT — REFRACTION
OD_SPHERE: +1.00
OD_ADD: +2.75
OS_CYLINDER: -0.50
OS_AXIS: 180
OS_SPHERE: +0.75
OS_ADD: +2.75
OD_AXIS: 175
OD_CYLINDER: -0.50

## 2024-01-02 ASSESSMENT — CUP TO DISC RATIO
OD_RATIO: 0.3
OS_RATIO: 0.3

## 2024-01-02 ASSESSMENT — REFRACTION_MANIFEST
OS_CYLINDER: -0.50
OS_AXIS: 180
OD_CYLINDER: -0.50
OS_ADD: +2.75
OS_SPHERE: +0.75
OD_AXIS: 175
OD_ADD: +2.75
OD_SPHERE: +1.00

## 2024-01-02 ASSESSMENT — TONOMETRY
OS_IOP_MMHG: 15
IOP_METHOD: GOLDMANN APPLANATION
OD_IOP_MMHG: 16

## 2024-01-02 ASSESSMENT — CONF VISUAL FIELD
OD_SUPERIOR_NASAL_RESTRICTION: 0
OS_NORMAL: 1
OS_INFERIOR_NASAL_RESTRICTION: 0
OD_INFERIOR_TEMPORAL_RESTRICTION: 0
OD_INFERIOR_NASAL_RESTRICTION: 0
OS_SUPERIOR_NASAL_RESTRICTION: 0
METHOD: COUNTING FINGERS
OD_NORMAL: 1
OS_INFERIOR_TEMPORAL_RESTRICTION: 0
OD_SUPERIOR_TEMPORAL_RESTRICTION: 0
OS_SUPERIOR_TEMPORAL_RESTRICTION: 0

## 2024-01-02 ASSESSMENT — VISUAL ACUITY
OD_SC: 20/30
OS_SC: 20/25
METHOD: SNELLEN - LINEAR

## 2024-01-02 ASSESSMENT — EXTERNAL EXAM - RIGHT EYE: OD_EXAM: NORMAL

## 2024-01-02 ASSESSMENT — SLIT LAMP EXAM - LIDS
COMMENTS: NORMAL
COMMENTS: NORMAL

## 2024-01-02 ASSESSMENT — EXTERNAL EXAM - LEFT EYE: OS_EXAM: NORMAL

## 2024-01-02 NOTE — PROGRESS NOTES
Assessment/Plan   Diagnoses and all orders for this visit:  Hypermetropia of both eyes  Regular astigmatism of both eyes  Presbyopia  New spec rx released today per patient request. Ocular health wnl for age OU. Monitor 1 year or sooner prn. Refraction billed today.    Type 2 diabetes mellitus without complication, without long-term current use of insulin (CMS/[facility])  The patient has diabetes without any evidence of retinopathy.  The patient was advised to maintain tight glucose control, tight blood pressure control, and favorable levels of cholesterol, low density lipoprotein, and high density lipoproteins.  Follow up in one year was recommended.

## 2024-01-09 ENCOUNTER — ANESTHESIA EVENT (OUTPATIENT)
Dept: OPERATING ROOM | Facility: CLINIC | Age: 69
End: 2024-01-09
Payer: MEDICARE

## 2024-01-09 RX ORDER — SODIUM CHLORIDE, SODIUM LACTATE, POTASSIUM CHLORIDE, CALCIUM CHLORIDE 600; 310; 30; 20 MG/100ML; MG/100ML; MG/100ML; MG/100ML
100 INJECTION, SOLUTION INTRAVENOUS CONTINUOUS
OUTPATIENT
Start: 2024-01-09

## 2024-01-09 RX ORDER — ONDANSETRON HYDROCHLORIDE 2 MG/ML
4 INJECTION, SOLUTION INTRAVENOUS ONCE AS NEEDED
OUTPATIENT
Start: 2024-01-09

## 2024-01-09 RX ORDER — LIDOCAINE IN NACL,ISO-OSMOT/PF 30 MG/3 ML
0.1 SYRINGE (ML) INJECTION ONCE
OUTPATIENT
Start: 2024-01-09 | End: 2024-01-09

## 2024-01-10 ENCOUNTER — HOSPITAL ENCOUNTER (OUTPATIENT)
Dept: OPERATING ROOM | Facility: CLINIC | Age: 69
Setting detail: OUTPATIENT SURGERY
Discharge: HOME | End: 2024-01-10
Payer: MEDICARE

## 2024-01-10 ENCOUNTER — ANESTHESIA (OUTPATIENT)
Dept: OPERATING ROOM | Facility: CLINIC | Age: 69
End: 2024-01-10
Payer: MEDICARE

## 2024-01-10 VITALS
RESPIRATION RATE: 16 BRPM | SYSTOLIC BLOOD PRESSURE: 140 MMHG | TEMPERATURE: 96.8 F | HEART RATE: 58 BPM | DIASTOLIC BLOOD PRESSURE: 81 MMHG | OXYGEN SATURATION: 97 %

## 2024-01-10 DIAGNOSIS — Z12.11 COLON CANCER SCREENING: ICD-10-CM

## 2024-01-10 PROCEDURE — A45380 PR COLONOSCOPY,BIOPSY: Performed by: ANESTHESIOLOGY

## 2024-01-10 PROCEDURE — 7100000010 HC PHASE TWO TIME - EACH INCREMENTAL 1 MINUTE: Performed by: ANESTHESIOLOGY

## 2024-01-10 PROCEDURE — 45380 COLONOSCOPY AND BIOPSY: CPT | Performed by: INTERNAL MEDICINE

## 2024-01-10 PROCEDURE — 3600000002 HC OR TIME - INITIAL BASE CHARGE - PROCEDURE LEVEL TWO: Performed by: ANESTHESIOLOGY

## 2024-01-10 PROCEDURE — 3700000002 HC GENERAL ANESTHESIA TIME - EACH INCREMENTAL 1 MINUTE: Performed by: ANESTHESIOLOGY

## 2024-01-10 PROCEDURE — 88305 TISSUE EXAM BY PATHOLOGIST: CPT | Mod: TC,SUR,ELYLAB | Performed by: INTERNAL MEDICINE

## 2024-01-10 PROCEDURE — 3700000001 HC GENERAL ANESTHESIA TIME - INITIAL BASE CHARGE: Performed by: ANESTHESIOLOGY

## 2024-01-10 PROCEDURE — A45380 PR COLONOSCOPY,BIOPSY: Performed by: NURSE ANESTHETIST, CERTIFIED REGISTERED

## 2024-01-10 PROCEDURE — 3600000007 HC OR TIME - EACH INCREMENTAL 1 MINUTE - PROCEDURE LEVEL TWO: Performed by: ANESTHESIOLOGY

## 2024-01-10 PROCEDURE — 88305 TISSUE EXAM BY PATHOLOGIST: CPT | Performed by: PATHOLOGY

## 2024-01-10 PROCEDURE — 7100000009 HC PHASE TWO TIME - INITIAL BASE CHARGE: Performed by: ANESTHESIOLOGY

## 2024-01-10 PROCEDURE — 2500000004 HC RX 250 GENERAL PHARMACY W/ HCPCS (ALT 636 FOR OP/ED): Performed by: NURSE ANESTHETIST, CERTIFIED REGISTERED

## 2024-01-10 RX ORDER — ONDANSETRON HYDROCHLORIDE 2 MG/ML
INJECTION, SOLUTION INTRAVENOUS AS NEEDED
Status: DISCONTINUED | OUTPATIENT
Start: 2024-01-10 | End: 2024-01-10

## 2024-01-10 RX ORDER — SODIUM CHLORIDE, SODIUM LACTATE, POTASSIUM CHLORIDE, CALCIUM CHLORIDE 600; 310; 30; 20 MG/100ML; MG/100ML; MG/100ML; MG/100ML
INJECTION, SOLUTION INTRAVENOUS CONTINUOUS PRN
Status: DISCONTINUED | OUTPATIENT
Start: 2024-01-10 | End: 2024-01-10

## 2024-01-10 RX ORDER — PROPOFOL 10 MG/ML
INJECTION, EMULSION INTRAVENOUS CONTINUOUS PRN
Status: DISCONTINUED | OUTPATIENT
Start: 2024-01-10 | End: 2024-01-10

## 2024-01-10 RX ORDER — PROPOFOL 10 MG/ML
INJECTION, EMULSION INTRAVENOUS AS NEEDED
Status: DISCONTINUED | OUTPATIENT
Start: 2024-01-10 | End: 2024-01-10

## 2024-01-10 RX ADMIN — SODIUM CHLORIDE, SODIUM LACTATE, POTASSIUM CHLORIDE, AND CALCIUM CHLORIDE: .6; .31; .03; .02 INJECTION, SOLUTION INTRAVENOUS at 07:57

## 2024-01-10 RX ADMIN — PROPOFOL 200 MCG/KG/MIN: 10 INJECTION, EMULSION INTRAVENOUS at 08:30

## 2024-01-10 RX ADMIN — PROPOFOL 30 MG: 10 INJECTION, EMULSION INTRAVENOUS at 08:30

## 2024-01-10 RX ADMIN — ONDANSETRON 4 MG: 2 INJECTION INTRAMUSCULAR; INTRAVENOUS at 07:57

## 2024-01-10 SDOH — HEALTH STABILITY: MENTAL HEALTH: CURRENT SMOKER: 0

## 2024-01-10 ASSESSMENT — PAIN SCALES - GENERAL
PAINLEVEL_OUTOF10: 0 - NO PAIN

## 2024-01-10 ASSESSMENT — PAIN - FUNCTIONAL ASSESSMENT
PAIN_FUNCTIONAL_ASSESSMENT: 0-10

## 2024-01-10 ASSESSMENT — COLUMBIA-SUICIDE SEVERITY RATING SCALE - C-SSRS
6. HAVE YOU EVER DONE ANYTHING, STARTED TO DO ANYTHING, OR PREPARED TO DO ANYTHING TO END YOUR LIFE?: NO
2. HAVE YOU ACTUALLY HAD ANY THOUGHTS OF KILLING YOURSELF?: NO
1. IN THE PAST MONTH, HAVE YOU WISHED YOU WERE DEAD OR WISHED YOU COULD GO TO SLEEP AND NOT WAKE UP?: NO

## 2024-01-10 NOTE — DISCHARGE INSTRUCTIONS
During the first 24 hours after your procedure, you should:    - Resume normal diet, unless otherwise directed by your doctor.  - Resume your home medications, unless otherwise directed by your doctor.  - Refrain from driving or operative heavy machinery.  - Drink plenty of liquids.  - Avoid consuming alcohol.  - Avoid strenuous activity or heavy lifting.    After 24 hours, you can resume regular activity.    Call your doctor office immediately ([phone]) or come to the nearest emergency room if you experience:    - Abdominal tenderness  - Blood in your stool or vomit  - Difficulty urinating or passing stools  - Difficulty breathing  - Chest pain  - Fever       If you experience any problems or have any questions following discharge from the GI Lab, please call:   Dr. Arroyo [phone].   To reach your physician after hours call [phone] and ask for the GI physician on call.   During the first 24 hours after your procedure, you should:    - Resume normal diet, unless otherwise directed by your doctor.  - Resume your home medications, unless otherwise directed by your doctor.  - Refrain from driving or operative heavy machinery.  - Drink plenty of liquids.  - Avoid consuming alcohol.  - Avoid strenuous activity or heavy lifting.    After 24 hours, you can resume regular activity.    Call your doctor office immediately ([phone]) or come to the nearest emergency room if you experience:    - Abdominal tenderness  - Blood in your stool or vomit  - Difficulty urinating or passing stools  - Difficulty breathing  - Chest pain  - Fever       If you experience any problems or have any questions following discharge from the GI Lab, please call:   Dr. Arroyo [phone].   To reach your physician after hours call [phone] and ask for the GI physician on call.

## 2024-01-10 NOTE — ANESTHESIA PREPROCEDURE EVALUATION
Patient: Daphne Andrew    Procedure Information       Date/Time: 01/10/24 0800    Scheduled providers: Demond Arroyo MD; Bhupendra James MD    Procedure: COLONOSCOPY    Location: [facility] OR            Relevant Problems   Cardiovascular   (+) Aortic stenosis, mild   (+) Hyperlipidemia   (+) Hypertension      Endocrine   (+) Diabetes mellitus type 2 without retinopathy (CMS/HCC)   (+) Hyperparathyroidism (CMS/HCC)      GI   (+) Esophageal reflux   (+) Hiatal hernia      /Renal   (+) TRENT (acute kidney injury) (CMS/HCC)   (+) Cyst, kidney, acquired   (+) FSGS (focal segmental glomerulosclerosis)   (+) Stage 3 chronic kidney disease (CMS/HCC)      Neuro/Psych   (+) Meralgia paresthetica of left side      Pulmonary   (+) Obstructive sleep apnea on CPAP      Hematology   (+) Homozygous Factor V Leiden mutation (CMS/HCC)      Musculoskeletal   (+) Other spondylosis, cervical region      Infectious Disease   (+) Acute upper respiratory infection   (+) Campylobacter gastroenteritis      Other   (+) Arthritis of both knees       Clinical information reviewed:   Tobacco  Allergies  Meds   Med Hx  Surg Hx   Fam Hx  Soc Hx        NPO Detail:  NPO/Void Status  Date of Last Liquid: 01/10/24  Time of Last Liquid: 0300  Date of Last Solid: 01/08/24  Time of Last Solid: 1900  Last Intake Type: Clear fluids  Time of Last Void: 0753      Vitals:    01/10/24 0753   BP: 159/78   Pulse: 63   Resp: 16   Temp: 36.2 °C (97.2 °F)   SpO2: 99%          Physical Exam    Airway  Mallampati: II  TM distance: >3 FB  Neck ROM: full     Cardiovascular    Dental - normal exam     Pulmonary    Abdominal            Anesthesia Plan    History of general anesthesia?: yes  History of complications of general anesthesia?: no    ASA 3     MAC     The patient is not a current smoker.  Patient did not smoke on day of procedure.    intravenous induction   Anesthetic plan and risks discussed with patient.    Plan discussed with  CRNA and attending.

## 2024-01-10 NOTE — ANESTHESIA POSTPROCEDURE EVALUATION
Patient: Daphne Andrew    Procedure Summary       Date: 01/10/24 Room / Location: [facility] OR    Anesthesia Start: 0825 Anesthesia Stop: 0853    Procedure: COLONOSCOPY Diagnosis: Colon cancer screening    Scheduled Providers: Demond Arroyo MD; Bhupendra James MD Responsible Provider: Bhupendra James MD    Anesthesia Type: MAC ASA Status: 3            Anesthesia Type: MAC    Vitals Value Taken Time   /81 01/10/24 0919   Temp 36 °C (96.8 °F) 01/10/24 0919   Pulse 58 01/10/24 0919   Resp 16 01/10/24 0919   SpO2 97 % 01/10/24 0919       Anesthesia Post Evaluation    Patient participation: complete - patient participated  Level of consciousness: awake  Pain management: adequate  Airway patency: patent  Cardiovascular status: acceptable  Respiratory status: acceptable  Hydration status: acceptable  Postoperative Nausea and Vomiting: none  Comments: Did well    There were no known notable events for this encounter.

## 2024-01-10 NOTE — H&P
Chief Complaint  ADD (Patient is taking adderall 30mg bid and doing well with it. )    Subjective          Laura Ordaz presents to [facility]  History of Present Illness    Insomnia-taking vistaril prn with good results, pt states she rarely takes the trazadone.     ADD-Patient is taking adderall 30mg bid and doing well with it.    Contraception-pt requesting refill on her oral contraception.       Past Medical History:   Diagnosis Date   • Asthma    • Chronic allergic rhinitis    • Mood disorder (HCC)          No Known Allergies       Past Surgical History:   Procedure Laterality Date   • CYST REMOVAL     • WISDOM TOOTH EXTRACTION            Social History     Tobacco Use   • Smoking status: Former Smoker     Packs/day: 1.00   • Smokeless tobacco: Never Used   • Tobacco comment: STARTED AT AGE 13 AND STOPPED AT 29    Substance Use Topics   • Alcohol use: Yes     Comment: CURRENT SOME DAY/ DRINKS WEEKLY, WINE AND BEER         Family History   Problem Relation Age of Onset   • Colon cancer Other    • Breast cancer Other    • Lung cancer Other    • Heart disease Other    • Hypertension Other    • Diabetes Other         MELLITUS          Current Outpatient Medications on File Prior to Visit   Medication Sig   • amphetamine-dextroamphetamine (ADDERALL) 30 MG tablet Take 1 tablet by mouth 2 (two) times a day.   • hydrOXYzine pamoate (VISTARIL) 50 MG capsule Take 50 mg by mouth Daily As Needed.   • naproxen (NAPROSYN) 250 MG tablet Take 1 tablet by mouth Every 6 (Six) Hours As Needed.   • traZODone (DESYREL) 50 MG tablet Take 1 tablet by mouth At Night As Needed.   • [DISCONTINUED] norgestimate-ethinyl estradiol (Sprintec 28) 0.25-35 MG-MCG per tablet Take 1 tablet by mouth Daily.     No current facility-administered medications on file prior to visit.         Immunization History   Administered Date(s) Administered   • COVID-19 (MODERNA) 1st, 2nd, 3rd Dose Only 09/23/2021   •  History Of Present Illness  Daphne Andrew is a 68 y.o. male presenting with screening colonoscopy.     Past Medical History  Past Medical History:   Diagnosis Date    Acute sinusitis, unspecified     Acute sinusitis    Anemia, unspecified 02/11/2018    Low hematocrit    Cataract     Chondrocostal junction syndrome (tietze) 02/25/2015    Costochondritis    Chronic kidney disease     Cramp and spasm 08/22/2017    Muscle cramps    Diabetes (CMS/HCC)     Fatty (change of) liver, not elsewhere classified     Fatty liver    Hemorrhage of anus and rectum 02/27/2015    Rectal bleeding    Hyperlipidemia     Hypertension     Male erectile dysfunction, unspecified 06/27/2013    Male erectile disorder of organic origin    Nontoxic goiter, unspecified 02/04/2015    Substernal thyroid goiter    Other conditions influencing health status     Nephrolithiasis    Pain in right foot 03/25/2020    Right foot pain    Pain in unspecified knee 02/18/2016    Joint pain, knee    Personal history of colonic polyps 06/27/2013    History of adenomatous polyp of colon    Personal history of other diseases of the circulatory system 08/19/2015    History of cardiac murmur    Personal history of other diseases of the digestive system 01/22/2014    History of gastroesophageal reflux (GERD)    Personal history of other diseases of the nervous system and sense organs     History of deafness    Personal history of other diseases of the respiratory system     History of sore throat    Personal history of other specified conditions     History of fatigue    Personal history of pneumonia (recurrent) 02/25/2015    History of pneumonia    Prediabetes 02/17/2016    Prediabetes    Radiculopathy, cervical region     Cervical radiculopathy    Streptococcal pharyngitis 05/07/2018    Strep pharyngitis    Umbilical hernia without obstruction or gangrene     Umbilical hernia    Unilateral inguinal hernia, without obstruction or gangrene, not specified as recurrent      Left inguinal hernia    Unspecified injury of unspecified wrist, hand and finger(s), initial encounter 2014    Thumb injury    Vascular disorders of male genital organs 2015    Pelvic hematoma, male       Surgical History  Past Surgical History:   Procedure Laterality Date    COLONOSCOPY  2012    Complete Colonoscopy    OTHER SURGICAL HISTORY  2013    Thyroid Surgery Sub-Total Thyroidectomy    OTHER SURGICAL HISTORY  2015    Treatment Of The Left Side Of The Pelvis    OTHER SURGICAL HISTORY  2015    Hernia Repair Inguinal Unilateral        Social History  He reports that he quit smoking about 14 years ago. His smoking use included cigarettes. He has never used smokeless tobacco. He reports that he does not drink alcohol. No history on file for drug use.    Family History  Family History   Problem Relation Name Age of Onset    Other (dialysis patient) Mother          13 years on dialysis    Other (cardiac disorder) Mother      Diabetes Mother      Other (cardiac disorder) Father      Heart attack Father          after cholecystectomy    Cancer Sister          unknown  at 60    Other (dialysis patient) Brother          10 years on dialysis    Cancer Brother      Pulmonary embolism Son           unexpectedly        Allergies  Patient has no known allergies.    Review of Systems     Physical Exam     Last Recorded Vitals  Blood pressure 159/78, pulse 63, temperature 36.2 °C (97.2 °F), temperature source Temporal, resp. rate 16, SpO2 99 %.    Relevant Results             Assessment/Plan   Proceed with screening colonoscopy  Demond Arroyo MD     "COVID-19 (PFIZER) 09/23/2021   • Flu Vaccine Quad PF >18YRS 10/10/2020   • Flu Vaccine Split Quad 10/03/2018   • Influenza, Unspecified 10/24/2021   • Tdap 10/25/2017         /72   Pulse 104   Ht 175.3 cm (69\")   Wt 113 kg (249 lb)   SpO2 99%   BMI 36.77 kg/m²             Physical Exam  Vitals reviewed.   Constitutional:       Appearance: Normal appearance. She is well-developed.   HENT:      Head: Normocephalic and atraumatic.      Right Ear: External ear normal.      Left Ear: External ear normal.      Mouth/Throat:      Pharynx: No oropharyngeal exudate.   Eyes:      Conjunctiva/sclera: Conjunctivae normal.      Pupils: Pupils are equal, round, and reactive to light.   Neck:      Vascular: No carotid bruit.   Cardiovascular:      Rate and Rhythm: Normal rate and regular rhythm.      Heart sounds: No murmur heard.  No friction rub. No gallop.    Pulmonary:      Effort: Pulmonary effort is normal.      Breath sounds: Normal breath sounds. No wheezing or rhonchi.   Skin:     General: Skin is warm and dry.   Neurological:      Mental Status: She is alert and oriented to person, place, and time.      Cranial Nerves: No cranial nerve deficit.   Psychiatric:         Mood and Affect: Mood and affect normal.         Behavior: Behavior normal.         Thought Content: Thought content normal.         Judgment: Judgment normal.             Result Review :              POC Urine Drug Screen [facility]  Order: [id number]   Status: Final result     Visible to patient: No (scheduled for 11/16/2021 11:05 PM)     Next appt: None     Dx: Medication management    Specimen Information: Urine         0 Result Notes    Component   Ref Range & Units 09:04 3 mo ago 6 mo ago 9 mo ago   Amphetamine Screen, Urine   Negative Positive Abnormal   Negative      AMP INTERNAL CONTROL   Passed Passed  Passed      Barbiturates Screen, Urine   Negative Negative  Negative  Negative R  Negative R    BARBITURATE INTERNAL CONTROL   Passed " Passed  Passed      Buprenorphine, Screen, Urine   Negative Negative  Negative      BUPRENORPHINE INTERNAL CONTROL   Passed Passed  Passed      Benzodiazepine Screen, Urine   Negative Negative  Negative  Negative R  Negative R    BENZODIAZEPINE INTERNAL CONTROL   Passed Passed  Passed      Cocaine Screen, Urine   Negative Negative  Negative  Negative R  Negative R    COCAINE INTERNAL CONTROL   Passed Passed  Passed      MDMA (ECSTASY)   Negative Negative  Negative      MDMA (ECSTASY) INTERNAL CONTROL   Passed Passed  Passed      Methamphetamine, Ur   Negative Negative  Negative      METHAMPHETAMINE INTERNAL CONTROL   Passed Passed  Passed      Methadone Screen, Urine   Negative Negative  Negative  Negative R  Negative R    METHADONE INTERNAL CONTROL   Passed Passed  Passed      Opiate Screen   Negative Negative  Negative      OPIATES INTERNAL CONTROL   Passed Passed  Passed      Oxycodone Screen, Urine   Negative Negative  Negative  Negative R  Negative R    OXYCODONE INTERNAL CONTROL   Passed Passed  Passed      Phencyclidine (PCP), Urine   Negative Negative  Negative  Negative R  Negative R    PHENCYCLIDINE INTERNAL CONTROL   Passed Passed  Passed      THC, Screen, Urine   Negative Negative  Negative  Negative R  Negative R    THC INTERNAL CONTROL   Passed Passed  Passed      Lot Number  [id number]  [id number]      Expiration Date  04/30/2022 04/30/2022      Resulting Agency   [facility]              Specimen Collected: 11/16/21 09:04 Last Resulted: 11/16/21 09:04         Lab Flowsheet      Order Details      View Encounter      Lab and Collection Details      Routing      Result History        R=Reference range differs from displayed range          Result Care Coordination        Patient Communication     11/16/2021 11:05 PM   Not seen Back to Top               Provider Comment To Patient     11/16/2021 11:05 PM   Not seen     Routing History    Priority Sent On From To Message Type    11/16/2021  9:05 AM Janine  RENO Bennett Robyn Ann, MORIAH Results                       Assessment and Plan      Diagnoses and all orders for this visit:    1. Attention deficit hyperactivity disorder (ADHD), predominantly inattentive type (Primary)  Comments:  Doing well on current dose of Adderall twice daily, will request refill be sent to pharmacy.    2. Insomnia, unspecified type  Comments:  well controlled on vistaril, cont current medicaions.    3. Medication management  -     POC Urine Drug Screen [facility]    4. Encounter for surveillance of contraceptives, unspecified contraceptive  -     norgestimate-ethinyl estradiol (Sprintec 28) 0.25-35 MG-MCG per tablet; Take 1 tablet by mouth Daily.  Dispense: 28 tablet; Refill: 5              Follow Up     Return in about 3 months (around 2/16/2022).    Patient was given instructions and counseling regarding her condition or for health maintenance advice. Please see specific information pulled into the AVS if appropriate.

## 2024-01-10 NOTE — LETTER
For this exam, you must change your diet for a few days and take the bowel prep medication your doctor orders. The bowel prep medication is a laxative that cleans stool(poop) out of your colon. This helps the doctor see the area clearly. If there is stool in your colon, your exam may need to be cancelled or redone sooner than it would be if there was a good or excellent cleansing.  Most bowel preps are split into 2 parts. You take one part of your bowel prep, wait for at least few hours and then take the second part of your bowel prep. Read these instructions carefully.  What to expect  The bowel prep medication causes frequent and watery poop (diarrhea), so stay near a bathroom after you take it. Sometimes it takes a few hours to start working. Its normal to have some mild belly cramps and bloating after drinking the prep. If your bottom gets sore from the frequent movements, you can apply Maxim cream or Vaseline to your bottom.  What you will need  Bowel prep medication- pick this up from your drug store a few days before the exam. Most patients take 2 doses split atpart to give their bowel a rest and improve the cleansing effect.   Clear liquids- clear liquids are those you can see through and include:  water, clear pop like ginger ale and clear fruit juices without pulp such as apple, lemonade or white grape juice  Clear broth- beef, chicken or vegetable  Jello, popsicles and sports drink like Gatorade- no red or dark colors  Plain coffee or tea- no milk or creamer    Instructions on the Miralax bottle are not the same as what is listed here. For your bowel prep, follow the steps listed in this section.  How to do a Split-Dose Bowel Prep with Miralax  2-3 days before your exam   the bowel prep items at the grocery or drugstore:  1 bottle of Miralax- 8.3 ounces (238 grams)  1 box of Dulcolax laxative tablets or generic bisacodyl- 5 mg. Each  64 ounces of gatorade, Gatorade G2 or Propel Zero- do not buy  red or purple. If you have diabetes, Gatorade G2 or Propel Zero are preferred.  2. Buy any clear liquids you want for your prep day.  The day before your exam  In the morning you may eat a banana and white toast. After that, you can only have clear liquids and 2 Saltine crackers to help with nausea- you cannot eat any other solid food. Do not drink alcohol.  Between 11:00 am and 5:00 pm  Drink 8 ounces of clear liquids each hour  At 3:00 pm  Take 2 dulcolax (bisacodyl) tablets with 8 ounces of clear liquids.  At 5:00 pm  Mix the whole bottle of Miralax powder in 64 ounces of Gatorade, Gatorade G@ or Propel Zero. Shake until the Miralax is dissolved.  Drink an 8 ounce of the mixture every 10-15 minutes for a total of 4 glasses (32 ounces).  Put the rest in the refrigerator. It should be 4 cups. You will drink it the next day for the second half of your prep.  Take 2 Dulcolax (bisacodyl) tablets with the las glass of the mixture or water.  The rest of the night  Stay near the toilet. This medication helps clean your bowel for the exam and causes bowel movements that can be sudden.  Drink 8 ounces of clear liquids each hour you are awake.  The day of your exam  Take your heart and blood pressure medications with water.  5 hours before your arrival time  Drink the rest of the bowel prep mixture.  Stop drinking clear liquids 3 hours before your arrival time.  Don't drink alcohol the whole day.

## 2024-01-16 LAB
LABORATORY COMMENT REPORT: NORMAL
PATH REPORT.FINAL DX SPEC: NORMAL
PATH REPORT.GROSS SPEC: NORMAL
PATH REPORT.TOTAL CANCER: NORMAL

## 2024-02-07 ENCOUNTER — OFFICE VISIT (OUTPATIENT)
Dept: ENDOCRINOLOGY | Facility: CLINIC | Age: 69
End: 2024-02-07
Payer: MEDICARE

## 2024-02-07 VITALS
SYSTOLIC BLOOD PRESSURE: 149 MMHG | BODY MASS INDEX: 27.16 KG/M2 | HEIGHT: 71 IN | DIASTOLIC BLOOD PRESSURE: 76 MMHG | WEIGHT: 194 LBS

## 2024-02-07 DIAGNOSIS — E78.5 HYPERLIPIDEMIA, UNSPECIFIED HYPERLIPIDEMIA TYPE: Primary | ICD-10-CM

## 2024-02-07 DIAGNOSIS — N18.31 STAGE 3A CHRONIC KIDNEY DISEASE (MULTI): ICD-10-CM

## 2024-02-07 DIAGNOSIS — E11.9 DIABETES MELLITUS TYPE 2 WITHOUT RETINOPATHY (MULTI): ICD-10-CM

## 2024-02-07 LAB
POC FINGERSTICK BLOOD GLUCOSE: 169 MG/DL (ref 70–100)
POC HEMOGLOBIN A1C: 6.5 % (ref 4.2–6.5)

## 2024-02-07 PROCEDURE — 83036 HEMOGLOBIN GLYCOSYLATED A1C: CPT | Performed by: STUDENT IN AN ORGANIZED HEALTH CARE EDUCATION/TRAINING PROGRAM

## 2024-02-07 PROCEDURE — 1160F RVW MEDS BY RX/DR IN RCRD: CPT | Performed by: STUDENT IN AN ORGANIZED HEALTH CARE EDUCATION/TRAINING PROGRAM

## 2024-02-07 PROCEDURE — 1126F AMNT PAIN NOTED NONE PRSNT: CPT | Performed by: STUDENT IN AN ORGANIZED HEALTH CARE EDUCATION/TRAINING PROGRAM

## 2024-02-07 PROCEDURE — 3077F SYST BP >= 140 MM HG: CPT | Performed by: STUDENT IN AN ORGANIZED HEALTH CARE EDUCATION/TRAINING PROGRAM

## 2024-02-07 PROCEDURE — 95251 CONT GLUC MNTR ANALYSIS I&R: CPT | Performed by: STUDENT IN AN ORGANIZED HEALTH CARE EDUCATION/TRAINING PROGRAM

## 2024-02-07 PROCEDURE — 1036F TOBACCO NON-USER: CPT | Performed by: STUDENT IN AN ORGANIZED HEALTH CARE EDUCATION/TRAINING PROGRAM

## 2024-02-07 PROCEDURE — 99214 OFFICE O/P EST MOD 30 MIN: CPT | Performed by: STUDENT IN AN ORGANIZED HEALTH CARE EDUCATION/TRAINING PROGRAM

## 2024-02-07 PROCEDURE — 1159F MED LIST DOCD IN RCRD: CPT | Performed by: STUDENT IN AN ORGANIZED HEALTH CARE EDUCATION/TRAINING PROGRAM

## 2024-02-07 PROCEDURE — 82962 GLUCOSE BLOOD TEST: CPT | Performed by: STUDENT IN AN ORGANIZED HEALTH CARE EDUCATION/TRAINING PROGRAM

## 2024-02-07 PROCEDURE — 3078F DIAST BP <80 MM HG: CPT | Performed by: STUDENT IN AN ORGANIZED HEALTH CARE EDUCATION/TRAINING PROGRAM

## 2024-02-07 RX ORDER — AMLODIPINE BESYLATE 5 MG/1
5 TABLET ORAL DAILY
COMMUNITY
Start: 2023-12-19

## 2024-02-07 ASSESSMENT — ENCOUNTER SYMPTOMS: CONSTITUTIONAL NEGATIVE: 1

## 2024-02-07 NOTE — PROGRESS NOTES
Subjective   Patient ID: Daphne Andrew is a 68 y.o. male who presents for for DM follow up , metfrmin stopped by his nephrologist    Dexcom downloads reviewed  Range 61 % , high 25 % , very high 14 %  On trulclity 0.75 mg     Review of Systems   Constitutional: Negative.        Objective   Physical Exam  Constitutional:       Appearance: Normal appearance.   Cardiovascular:      Rate and Rhythm: Normal rate and regular rhythm.   Pulmonary:      Effort: Pulmonary effort is normal.      Breath sounds: Normal breath sounds.   Neurological:      General: No focal deficit present.      Mental Status: He is alert.         Assessment/Plan     -Well-controlled type 2 diabetes with A1c of 6.5 off  metformin and is  GLP1 . He weaned off lantus after starting GLP1       -DM nephropathy follows with Dr. Ramirez , now off metfrmoin and lisinopril  -no DM neuropathy   -follows with opthalmology yearly.    plan :       Increase Trullcity to 1.5 mg to help with wt management   Continue Dexcom  CGM monitor      RTC in 3-4 months

## 2024-03-29 ENCOUNTER — TELEPHONE (OUTPATIENT)
Dept: ENDOCRINOLOGY | Facility: CLINIC | Age: 69
End: 2024-03-29
Payer: MEDICARE

## 2024-03-29 NOTE — TELEPHONE ENCOUNTER
Daphne called 3-29-24 asking if there is any other medication he can take  In place of Trulicity.  Please advise

## 2024-04-24 ENCOUNTER — OFFICE VISIT (OUTPATIENT)
Dept: PRIMARY CARE | Facility: CLINIC | Age: 69
End: 2024-04-24
Payer: MEDICARE

## 2024-04-24 DIAGNOSIS — Z71.85 IMMUNIZATION COUNSELING: ICD-10-CM

## 2024-04-24 DIAGNOSIS — E78.5 HYPERLIPIDEMIA, UNSPECIFIED HYPERLIPIDEMIA TYPE: ICD-10-CM

## 2024-04-24 DIAGNOSIS — N18.32: ICD-10-CM

## 2024-04-24 DIAGNOSIS — I10 PRIMARY HYPERTENSION: ICD-10-CM

## 2024-04-24 DIAGNOSIS — Z79.4: ICD-10-CM

## 2024-04-24 DIAGNOSIS — I35.0 AORTIC STENOSIS, MILD: ICD-10-CM

## 2024-04-24 DIAGNOSIS — N40.1 BPH ASSOCIATED WITH NOCTURIA: Chronic | ICD-10-CM

## 2024-04-24 DIAGNOSIS — E21.3 HYPERPARATHYROIDISM (MULTI): Chronic | ICD-10-CM

## 2024-04-24 DIAGNOSIS — D68.51 HOMOZYGOUS FACTOR V LEIDEN MUTATION (MULTI): Chronic | ICD-10-CM

## 2024-04-24 DIAGNOSIS — I44.4 LAFB (LEFT ANTERIOR FASCICULAR BLOCK): Chronic | ICD-10-CM

## 2024-04-24 DIAGNOSIS — Z23 IMMUNIZATION DUE: ICD-10-CM

## 2024-04-24 DIAGNOSIS — R35.1 BPH ASSOCIATED WITH NOCTURIA: Chronic | ICD-10-CM

## 2024-04-24 DIAGNOSIS — Z00.00 ROUTINE GENERAL MEDICAL EXAMINATION AT HEALTH CARE FACILITY: Primary | ICD-10-CM

## 2024-04-24 DIAGNOSIS — E13.22: ICD-10-CM

## 2024-04-24 PROBLEM — S37.009A INJURY OF KIDNEY: Status: ACTIVE | Noted: 2023-06-28

## 2024-04-24 PROBLEM — M17.10 ARTHRITIS OF KNEE: Status: ACTIVE | Noted: 2024-04-24

## 2024-04-24 PROBLEM — R73.03 PREDIABETES: Status: ACTIVE | Noted: 2024-04-24

## 2024-04-24 PROBLEM — K76.0 STEATOSIS OF LIVER: Status: ACTIVE | Noted: 2024-04-24

## 2024-04-24 PROBLEM — M77.11 LATERAL EPICONDYLITIS OF RIGHT ELBOW: Status: ACTIVE | Noted: 2024-04-24

## 2024-04-24 PROBLEM — Z86.69 HISTORY OF HEARING LOSS: Status: ACTIVE | Noted: 2024-04-24

## 2024-04-24 PROBLEM — K40.90 LEFT INGUINAL HERNIA: Status: ACTIVE | Noted: 2023-04-11

## 2024-04-24 PROBLEM — M94.0 COSTOCHONDRITIS: Status: ACTIVE | Noted: 2024-04-24

## 2024-04-24 PROBLEM — E87.20 ACIDOSIS, UNSPECIFIED: Status: ACTIVE | Noted: 2023-06-28

## 2024-04-24 PROBLEM — K52.9 COLITIS: Status: ACTIVE | Noted: 2024-04-24

## 2024-04-24 PROCEDURE — 99397 PER PM REEVAL EST PAT 65+ YR: CPT | Performed by: INTERNAL MEDICINE

## 2024-04-24 PROCEDURE — 1170F FXNL STATUS ASSESSED: CPT | Performed by: INTERNAL MEDICINE

## 2024-04-24 PROCEDURE — 1123F ACP DISCUSS/DSCN MKR DOCD: CPT | Performed by: INTERNAL MEDICINE

## 2024-04-24 PROCEDURE — 1160F RVW MEDS BY RX/DR IN RCRD: CPT | Performed by: INTERNAL MEDICINE

## 2024-04-24 PROCEDURE — 1159F MED LIST DOCD IN RCRD: CPT | Performed by: INTERNAL MEDICINE

## 2024-04-24 PROCEDURE — 93000 ELECTROCARDIOGRAM COMPLETE: CPT | Performed by: INTERNAL MEDICINE

## 2024-04-24 PROCEDURE — G0439 PPPS, SUBSEQ VISIT: HCPCS | Performed by: INTERNAL MEDICINE

## 2024-04-24 PROCEDURE — 3078F DIAST BP <80 MM HG: CPT | Performed by: INTERNAL MEDICINE

## 2024-04-24 PROCEDURE — 3074F SYST BP LT 130 MM HG: CPT | Performed by: INTERNAL MEDICINE

## 2024-04-24 ASSESSMENT — ACTIVITIES OF DAILY LIVING (ADL)
GROCERY_SHOPPING: INDEPENDENT
DOING_HOUSEWORK: INDEPENDENT
TAKING_MEDICATION: INDEPENDENT
MANAGING_FINANCES: INDEPENDENT
DRESSING: INDEPENDENT
BATHING: INDEPENDENT

## 2024-04-24 ASSESSMENT — PATIENT HEALTH QUESTIONNAIRE - PHQ9
2. FEELING DOWN, DEPRESSED OR HOPELESS: NOT AT ALL
1. LITTLE INTEREST OR PLEASURE IN DOING THINGS: NOT AT ALL
SUM OF ALL RESPONSES TO PHQ9 QUESTIONS 1 AND 2: 0

## 2024-04-24 ASSESSMENT — ENCOUNTER SYMPTOMS
OCCASIONAL FEELINGS OF UNSTEADINESS: 0
DEPRESSION: 0
LOSS OF SENSATION IN FEET: 0

## 2024-04-24 NOTE — PROGRESS NOTES
"Subjective   Reason for Visit: Daphne Andrew is an 68 y.o. male here for a Medicare Wellness visit.     Past Medical, Surgical, and Family History reviewed and updated in chart.         Here for wellness visit and follow-up  Overall doing well  He did have his colonoscopy in June with a polyp    No exertional chest pain, palpitations, dizziness, orthopnea or pedal edema.        Patient Care Team:  Cyril Irwin MD as PCP - General  Cyril Irwin MD as PCP - [employer] Medicare Advantage PCP     Review of Systems    Objective   Vitals:  /82 (BP Location: Left arm, Patient Position: Sitting, BP Cuff Size: Adult)   Pulse 67   Ht 1.778 m (5' 10\")   Wt 85.7 kg (189 lb)   SpO2 97%   BMI 27.12 kg/m²       Physical Exam  Constitutional:       Appearance: Normal appearance.   HENT:      Head: Normocephalic and atraumatic.      Right Ear: Tympanic membrane normal.      Left Ear: Tympanic membrane normal.      Nose: Nose normal.   Eyes:      General: No scleral icterus.     Extraocular Movements: Extraocular movements intact.      Conjunctiva/sclera: Conjunctivae normal.      Pupils: Pupils are equal, round, and reactive to light.   Cardiovascular:      Rate and Rhythm: Normal rate and regular rhythm.      Pulses: Normal pulses.      Heart sounds: Murmur heard.   Pulmonary:      Effort: Pulmonary effort is normal. No respiratory distress.      Breath sounds: Normal breath sounds. No stridor. No wheezing.   Abdominal:      General: Abdomen is flat. Bowel sounds are normal. There is no distension.      Palpations: Abdomen is soft. There is no mass.      Tenderness: There is no abdominal tenderness. There is no guarding.   Musculoskeletal:         General: No swelling, tenderness or deformity. Normal range of motion.      Cervical back: Normal range of motion and neck supple. No tenderness.   Lymphadenopathy:      Cervical: No cervical adenopathy.   Skin:     General: Skin is warm and dry.      Findings: No lesion or rash. "   Neurological:      General: No focal deficit present.      Mental Status: He is alert and oriented to person, place, and time.      Cranial Nerves: No cranial nerve deficit.      Motor: No weakness.   Psychiatric:         Mood and Affect: Mood normal.         Behavior: Behavior normal.         Thought Content: Thought content normal.         Judgment: Judgment normal.      Comments: Rather flat affect, his baseline           Assessment/Plan   Problem List Items Addressed This Visit       Aortic stenosis, mild    Relevant Orders    Transthoracic Echo Complete    Diabetes mellitus (Multi)    Hyperlipidemia    Relevant Orders    Prostate Specific Antigen    Lipid Panel    Alanine Aminotransferase    TSH with reflex to Free T4 if abnormal    Hypertension    Relevant Orders    ECG 12 lead (Clinic Performed) (Completed)    Prostate Specific Antigen    Lipid Panel    Alanine Aminotransferase    TSH with reflex to Free T4 if abnormal    Hyperparathyroidism (Multi)    Homozygous Factor V Leiden mutation (Multi)    BPH associated with nocturia    Relevant Orders    Prostate Specific Antigen    LAFB (left anterior fascicular block)     Other Visit Diagnoses       Routine general medical examination at health care facility    -  Primary    Relevant Orders    1 [facility] Follow Up [facility] - PCP - Wellness Exam    Immunization counseling        Relevant Medications    zoster vaccine-recombinant adjuvanted (Shingrix) 50 mcg/0.5 mL vaccine    zoster vaccine-recombinant adjuvanted (Shingrix) 50 mcg/0.5 mL vaccine    Immunization due              living situation - he lives in a 2 story home, w/ bedroom upstairs. His home in [place] has 5 stories, now w/ an elevator.  He still has siblings and cousins in [place].        Labile insulin-dependent diabetes/elevated weight-obviously weight loss important. He will follow-up with endocrinology.  Presently using the Dexicon 6 continuous glucose monitoring-improved control  with better diet selections with immediate feedback            Met with Dr. Reid in early Oct.  Continuous glucose monitor helpful.              A1c 6.5% February 2024-a bit elevated from his October 2023 A1c when it was 6.2%.  He will continue his diabetic plan and endocrine follow-up visits as scheduled    elevated weight/dyslipidemia- he will continue his statin and weight loss efforts. HDL 41, LDL 97, total cholesterol 163 July '22.               Fasting labs ordered for this year.  He will do soon.            Hypertension/edema-he will continue his medication low-salt diet and weight loss efforts             10/23-systolic pressure elevated.  We will start low-dose metoprolol and review with his nephrologist at his appointment 11/1/2023.             4/24-blood pressure improved on recheck     Mild aortic stenosis- noted on echo '17; 2 year follow-up echo 11/19 stable           4/24-echo ordered        FSGS secondary- proteinuria- -status post renal biopsy-he will follow-up with Dr. Richard Pierre; recent visit OK he notes. No edema today.    Appointment in February 2022 with Dr. Richard Pierre- every 6 months- more frequent w/ renal lab concerns. Creatinine 1.9 in August 2021.             February 2023-creatinine 2.14.  He will continue to see him every 6 months; appointment 11/1/2023    Hyperparathyroidism-secondary-he will continue to manage with his nephrologist    Vitamin D deficiency- encouraged patient to continue vitamin D daily as ordered. Will consider vitamin D level regularly.      Factor V Leiden homozygous trait-discovered after his son's death from DVTs and PE-coronary requested the entire family be screened for hereditary thrombophilia-patient saw Dr. Hutchinson 11/22-further testing revealed homozygous trait for factor V Leiden-baby aspirin daily recommended as well as consideration for anticoagulation after any elective surgeries        BPH/ED/renal cyst-status post urology consultation with Dr. Horne in  the past. PSA normal Jul '22. Ordered for this summer              PSA not yet done-he will do labs soon     Colon cancer screening- colonoscopy Updated 1/24;  message sent by Secure Chat:   Demond - just double checking. You did a colonoscopy on Daphne on 1/10/24. Benign polyp. Is his next colonoscopy in 10 year? Thanks!    1 min  PG  Demond Arroyo MD  Correct     Colonoscopy 1/24;  next rec'd 10 yrs - 1/34               Bilateral moderate knee arthritis-noted on x-rays-January 2021- knee pain ongoing especially kneeling on his knees problematic presently.      He has worked with several orthopedist.  Most recently he met with Dr. Martinez who did an injection in the left knee which helped.            10/23-left knee recently injected-improved he will follow-up with Ortho accordingly as needed              4/24 - knees much better after injection in left knee    Right lateral epicondylitis-information sheet provided stretching and brace encouraged to follow-up if not improved     Right foot pain and swelling/Right ankle Charcot deformity-improved wearing a boot earlier in 2019 per Dr. Mayorga/podiatry. He will see her as needed Coincidentally, recurred again months later. His only doing better. Dr. Diane Mayorga/podiatry. He will elevate the foot continue limited activity and follow-up with her if symptoms persist               Improved presently     Epistaxis - improved     Obstructive sleep apnea on C Pap - He'll f/u w/ Dr. Johnson in pulmonary as directed. He admits he only does this about 2 nights/ wk.         He's not really using this at all. Encouraged follow-up.  He will meet with the sleep team     Acid reflux- . He's found esomeprazole helpful. However at this point he stopped this and presently uses Tums occasionally   Improved presently.              Reflux has been a bit of an issue.  He will use famotidine as needed.        Seasonal allergies-he will continue seasonally as needed     Dental  visit-encouraged semiannually.      Cataracts / Vision care-he will continue annually with eye clinic -last vision appointment Sep '21 per Dr. Beltran- he retired             He will establish with a new eye doctor at our eye clinic to check cataracts and do annual diabetic eye exam                 bereavement - Patient lost his youngest son on 6/5/22-bilateral DVTs and PE.  After further hematology testing of the whole family, it turns out that Coni was  homozygous for the factor V Leiden trait.     Flu shot encouraged each fall - updated 10/18/23     Prevnar 13-updated 4/12/2023     discussed Shingrix / new shingles shot - 2 shot series w/ limited availability. Encouraged patient to consider getting this when/ where available.                4/24-slip provided again    RSV vaccination-encourage 4/24     Follow-up in semiannually, sooner as needed      Charting was completed using voice recognition technology and may include unintended errors.

## 2024-04-25 VITALS
SYSTOLIC BLOOD PRESSURE: 126 MMHG | OXYGEN SATURATION: 97 % | DIASTOLIC BLOOD PRESSURE: 70 MMHG | WEIGHT: 189 LBS | BODY MASS INDEX: 27.06 KG/M2 | HEIGHT: 70 IN | HEART RATE: 67 BPM

## 2024-04-25 PROBLEM — I44.4 LAFB (LEFT ANTERIOR FASCICULAR BLOCK): Status: ACTIVE | Noted: 2024-04-25

## 2024-04-25 PROBLEM — R35.1 BPH ASSOCIATED WITH NOCTURIA: Status: ACTIVE | Noted: 2024-04-25

## 2024-04-25 PROBLEM — N40.1 BPH ASSOCIATED WITH NOCTURIA: Status: ACTIVE | Noted: 2024-04-25

## 2024-04-25 ASSESSMENT — ACTIVITIES OF DAILY LIVING (ADL)
ADEQUATE_TO_COMPLETE_ADL: YES
TOILETING: INDEPENDENT
JUDGMENT_ADEQUATE_SAFELY_COMPLETE_DAILY_ACTIVITIES: YES
GROOMING: INDEPENDENT
PATIENT'S MEMORY ADEQUATE TO SAFELY COMPLETE DAILY ACTIVITIES?: YES
ASSISTIVE_DEVICE: EYEGLASSES
FEEDING YOURSELF: INDEPENDENT

## 2024-05-17 ENCOUNTER — TELEPHONE (OUTPATIENT)
Dept: ENDOCRINOLOGY | Facility: CLINIC | Age: 69
End: 2024-05-17
Payer: MEDICARE

## 2024-05-29 ENCOUNTER — OFFICE VISIT (OUTPATIENT)
Dept: ENDOCRINOLOGY | Facility: CLINIC | Age: 69
End: 2024-05-29
Payer: MEDICARE

## 2024-05-29 ENCOUNTER — PHARMACY VISIT (OUTPATIENT)
Dept: PHARMACY | Facility: CLINIC | Age: 69
End: 2024-05-29
Payer: COMMERCIAL

## 2024-05-29 VITALS
DIASTOLIC BLOOD PRESSURE: 60 MMHG | BODY MASS INDEX: 31.71 KG/M2 | HEIGHT: 63 IN | SYSTOLIC BLOOD PRESSURE: 135 MMHG | WEIGHT: 179 LBS

## 2024-05-29 DIAGNOSIS — Z97.8 USES SELF-APPLIED CONTINUOUS GLUCOSE MONITORING DEVICE: ICD-10-CM

## 2024-05-29 DIAGNOSIS — E11.9 DIABETES MELLITUS TYPE 2 WITHOUT RETINOPATHY (MULTI): ICD-10-CM

## 2024-05-29 DIAGNOSIS — E78.5 HYPERLIPIDEMIA, UNSPECIFIED HYPERLIPIDEMIA TYPE: Primary | ICD-10-CM

## 2024-05-29 DIAGNOSIS — N18.31 STAGE 3A CHRONIC KIDNEY DISEASE (MULTI): ICD-10-CM

## 2024-05-29 LAB
POC FINGERSTICK BLOOD GLUCOSE: 150 MG/DL (ref 70–100)
POC HEMOGLOBIN A1C: 7.7 % (ref 4.2–6.5)

## 2024-05-29 PROCEDURE — 83036 HEMOGLOBIN GLYCOSYLATED A1C: CPT | Performed by: STUDENT IN AN ORGANIZED HEALTH CARE EDUCATION/TRAINING PROGRAM

## 2024-05-29 PROCEDURE — 3075F SYST BP GE 130 - 139MM HG: CPT | Performed by: STUDENT IN AN ORGANIZED HEALTH CARE EDUCATION/TRAINING PROGRAM

## 2024-05-29 PROCEDURE — 1123F ACP DISCUSS/DSCN MKR DOCD: CPT | Performed by: STUDENT IN AN ORGANIZED HEALTH CARE EDUCATION/TRAINING PROGRAM

## 2024-05-29 PROCEDURE — 1160F RVW MEDS BY RX/DR IN RCRD: CPT | Performed by: STUDENT IN AN ORGANIZED HEALTH CARE EDUCATION/TRAINING PROGRAM

## 2024-05-29 PROCEDURE — 1159F MED LIST DOCD IN RCRD: CPT | Performed by: STUDENT IN AN ORGANIZED HEALTH CARE EDUCATION/TRAINING PROGRAM

## 2024-05-29 PROCEDURE — 82962 GLUCOSE BLOOD TEST: CPT | Performed by: STUDENT IN AN ORGANIZED HEALTH CARE EDUCATION/TRAINING PROGRAM

## 2024-05-29 PROCEDURE — 99214 OFFICE O/P EST MOD 30 MIN: CPT | Performed by: STUDENT IN AN ORGANIZED HEALTH CARE EDUCATION/TRAINING PROGRAM

## 2024-05-29 PROCEDURE — RXMED WILLOW AMBULATORY MEDICATION CHARGE

## 2024-05-29 PROCEDURE — 3078F DIAST BP <80 MM HG: CPT | Performed by: STUDENT IN AN ORGANIZED HEALTH CARE EDUCATION/TRAINING PROGRAM

## 2024-05-29 RX ORDER — DULAGLUTIDE 1.5 MG/.5ML
1.5 INJECTION, SOLUTION SUBCUTANEOUS
Qty: 6 ML | Refills: 1 | Status: SHIPPED | OUTPATIENT
Start: 2024-06-02

## 2024-05-29 NOTE — PROGRESS NOTES
"Subjective   Patient ID: Daphne Andrew is a 68 y.o. male who presents for Diabetes (Has been out of trulicity for 6 weeks  Dec com CGM only recorded until 4/30/24.  Unable to remember pass word to re set up ...pt to call dexcom support Dx type 2 , dx in 2016, Family members =mom, PCP = Dr Irwin, Does not seepotiatry, Sees an eye dr,   Has a Dexcom CGM having problem downloading/ pt started lantus 20 units at hs since  5/26/24)   Lab Results   Component Value Date    HGBA1C 7.7 (A) 05/29/2024      HPI   The pt is  a 68 y.o. male who presents for for DM follow up , he has been out of GLP1 for 6 weeks due to supply issues .  He had hyperglycemia of > 400 last week so he restarted his Lantus which we discontinued previously at dose of 20 units.  metformin stopped by his nephrologist previously     Dexcom downloads reviewed , however information is  not current . CGM not storing new events     Current regemin :  trulclity 1.5 mg       Review of Systems    Objective   Physical Exam  Constitutional:       Appearance: Normal appearance.   Cardiovascular:      Rate and Rhythm: Normal rate and regular rhythm.   Pulmonary:      Effort: Pulmonary effort is normal.      Breath sounds: Normal breath sounds.   Neurological:      Mental Status: He is alert.      Visit Vitals  /82   Ht 1.588 m (5' 2.5\")   Wt 81.2 kg (179 lb)   BMI 32.22 kg/m²   Smoking Status Former   BSA 1.89 m²        Assessment/Plan        -Uncontrolled type 2 diabetes with A1c of  7.7 was 6.5 , he is  off  metformin and is on GLP1.(However he has not been able to get Trulicity for 6 weeks due to supply issues ). Office samples given , also script sent to [facility]        -DM nephropathy follows with Dr. Ramirez , now off metfrmoin and lisinopril  -no DM neuropathy   -follows with opthalmology yearly.     RTC in 3-4 months   "

## 2024-05-30 ENCOUNTER — HOSPITAL ENCOUNTER (OUTPATIENT)
Dept: CARDIOLOGY | Facility: CLINIC | Age: 69
Discharge: HOME | End: 2024-05-30
Payer: MEDICARE

## 2024-05-30 DIAGNOSIS — I35.0 AORTIC STENOSIS, MILD: ICD-10-CM

## 2024-05-30 PROCEDURE — 93306 TTE W/DOPPLER COMPLETE: CPT

## 2024-05-30 PROCEDURE — 93306 TTE W/DOPPLER COMPLETE: CPT | Performed by: INTERNAL MEDICINE

## 2024-05-31 ENCOUNTER — TELEPHONE (OUTPATIENT)
Dept: PRIMARY CARE | Facility: CLINIC | Age: 69
End: 2024-05-31
Payer: MEDICARE

## 2024-05-31 LAB
AORTIC VALVE MEAN GRADIENT: 22.5 MMHG
AORTIC VALVE PEAK VELOCITY: 3.33 M/S
AV PEAK GRADIENT: 44.2 MMHG
AVA (PEAK VEL): 1.36 CM2
AVA (VTI): 1.44 CM2
EJECTION FRACTION APICAL 4 CHAMBER: 52.8
LEFT ATRIUM VOLUME AREA LENGTH INDEX BSA: 18.5 ML/M2
LEFT VENTRICLE INTERNAL DIMENSION DIASTOLE: 4.98 CM (ref 3.5–6)
LEFT VENTRICULAR OUTFLOW TRACT DIAMETER: 2.27 CM
LV EJECTION FRACTION BIPLANE: 59 %
MITRAL VALVE E/A RATIO: 0.77
MITRAL VALVE E/E' RATIO: 10.58
RIGHT VENTRICLE FREE WALL PEAK S': 10 CM/S
TRICUSPID ANNULAR PLANE SYSTOLIC EXCURSION: 2.7 CM

## 2024-05-31 NOTE — TELEPHONE ENCOUNTER
Pt called stating had echo yesterday and needs to see specialist/ cardiologist?  Please call pt when results for echo are in.    Please call pt at  [phone]

## 2024-06-02 DIAGNOSIS — I51.7 MILD AORTIC REGURGITATION WITH LEFT VENTRICULAR DILATION BY PRIOR ECHOCARDIOGRAM: ICD-10-CM

## 2024-06-02 DIAGNOSIS — I35.1 MILD AORTIC REGURGITATION WITH LEFT VENTRICULAR DILATION BY PRIOR ECHOCARDIOGRAM: ICD-10-CM

## 2024-06-02 DIAGNOSIS — I35.0 MODERATE AORTIC STENOSIS BY PRIOR ECHOCARDIOGRAM: Primary | ICD-10-CM

## 2024-06-02 NOTE — RESULT ENCOUNTER NOTE
Daphne    Thank you for doing the cardiac echocardiogram.  I am glad that your heart function appears normal.  The radiologist notes that since 5 years ago on the last cardiac echocardiogram, the aortic valve narrowing, or aortic stenosis, has progressed slightly from mild to moderate aortic stenosis.  There is also mild aortic regurgitation.    I would suggest that we follow this aortic valve finding with another echocardiogram in 1 year.  I have put that order in the computer.  Please make a note on your calendar to call cardiology next May, at [phone] to schedule that 12-month follow-up echocardiogram.  The orders are in the computer, and paperwork is not needed.    Once again thanks for doing this extra cardiac test.  Please notify me if you have any questions about these results.    Sincerely,  Cyril Irwin MD

## 2024-06-06 NOTE — TELEPHONE ENCOUNTER
Called and spoke with patient regarding his echo showing progression from mild to moderate aortic stenosis over the last 5 years.  I recommended that we should start checking this echo once a year.  Letter sent to him again.  He will follow-up as scheduled he will call with any additional concerns.

## 2024-09-18 ENCOUNTER — APPOINTMENT (OUTPATIENT)
Dept: ENDOCRINOLOGY | Facility: CLINIC | Age: 69
End: 2024-09-18
Payer: MEDICARE

## 2024-09-18 VITALS
DIASTOLIC BLOOD PRESSURE: 66 MMHG | HEIGHT: 71 IN | SYSTOLIC BLOOD PRESSURE: 120 MMHG | WEIGHT: 189 LBS | BODY MASS INDEX: 26.46 KG/M2

## 2024-09-18 DIAGNOSIS — N18.31 STAGE 3A CHRONIC KIDNEY DISEASE (MULTI): ICD-10-CM

## 2024-09-18 DIAGNOSIS — E11.9 DIABETES MELLITUS TYPE 2 WITHOUT RETINOPATHY (MULTI): Primary | ICD-10-CM

## 2024-09-18 DIAGNOSIS — Z97.8 USES SELF-APPLIED CONTINUOUS GLUCOSE MONITORING DEVICE: ICD-10-CM

## 2024-09-18 LAB
POC FINGERSTICK BLOOD GLUCOSE: 139 MG/DL (ref 70–100)
POC HEMOGLOBIN A1C: 6.9 % (ref 4.2–6.5)

## 2024-09-18 PROCEDURE — 95251 CONT GLUC MNTR ANALYSIS I&R: CPT | Performed by: STUDENT IN AN ORGANIZED HEALTH CARE EDUCATION/TRAINING PROGRAM

## 2024-09-18 PROCEDURE — 83036 HEMOGLOBIN GLYCOSYLATED A1C: CPT | Performed by: STUDENT IN AN ORGANIZED HEALTH CARE EDUCATION/TRAINING PROGRAM

## 2024-09-18 PROCEDURE — 3074F SYST BP LT 130 MM HG: CPT | Performed by: STUDENT IN AN ORGANIZED HEALTH CARE EDUCATION/TRAINING PROGRAM

## 2024-09-18 PROCEDURE — 3008F BODY MASS INDEX DOCD: CPT | Performed by: STUDENT IN AN ORGANIZED HEALTH CARE EDUCATION/TRAINING PROGRAM

## 2024-09-18 PROCEDURE — 82962 GLUCOSE BLOOD TEST: CPT | Performed by: STUDENT IN AN ORGANIZED HEALTH CARE EDUCATION/TRAINING PROGRAM

## 2024-09-18 PROCEDURE — 99214 OFFICE O/P EST MOD 30 MIN: CPT | Performed by: STUDENT IN AN ORGANIZED HEALTH CARE EDUCATION/TRAINING PROGRAM

## 2024-09-18 PROCEDURE — 1123F ACP DISCUSS/DSCN MKR DOCD: CPT | Performed by: STUDENT IN AN ORGANIZED HEALTH CARE EDUCATION/TRAINING PROGRAM

## 2024-09-18 PROCEDURE — 3078F DIAST BP <80 MM HG: CPT | Performed by: STUDENT IN AN ORGANIZED HEALTH CARE EDUCATION/TRAINING PROGRAM

## 2024-09-18 RX ORDER — TIRZEPATIDE 2.5 MG/.5ML
2.5 INJECTION, SOLUTION SUBCUTANEOUS
Qty: 6 ML | Refills: 0 | Status: SHIPPED | OUTPATIENT
Start: 2024-09-18

## 2024-09-18 NOTE — PROGRESS NOTES
"Subjective   Patient ID: Daphne Andrew is a 69 y.o. male who presents for Diabetes (Daphne Andrew is 69 y.o. male who presents fDx type 2 , dx in 2016, Family members =mom, PCP = Dr Irwin, Does not see potiatry, Sees an eye dr,PT has a  Dexcom CGM  testing glucose at least 4x/day due to fluctuating  blood glucose ND HYPOGLYCEMIA compliant WITH DIABETIC REGIME.  pT IS BENEFITING FROM cgm TREATMENT PLAN )   Lab Results   Component Value Date    HGBA1C 6.9 (A) 09/18/2024      HPI  The pt is  a 69 y.o. male who presents for for DM follow up   He is now on kidney transplant list  , not on dialysis     Current meds :  Trullcity 1.5 mg   Not on metformin due to CKD   Advised to discuss SGLT2 with nephrologist      Dexcom downloads reviewed   TIR 86 , high 13 , very high 1   Average BG 34      Current regemin :  trulclity 1.5 mg       Review of Systems    Objective   Physical Exam  Constitutional:       Appearance: Normal appearance.   Cardiovascular:      Rate and Rhythm: Normal rate and regular rhythm.   Pulmonary:      Effort: Pulmonary effort is normal.      Breath sounds: Normal breath sounds.   Neurological:      General: No focal deficit present.      Mental Status: He is alert.      Visit Vitals  /66   Ht 1.803 m (5' 11\")   Wt 85.7 kg (189 lb)   BMI 26.36 kg/m²   Smoking Status Former   BSA 2.07 m²        Assessment/Plan        -Uncontrolled type 2 diabetes with A1c of  6.9, he is  off  metformin and is on GLP1. I will change to Mounjaro 2.5 mg if covered by insurance , also advised to discuss starting SGLT2 with nephrologist. He is currently on transplant list I will defer SGLT2 to Dr. Ramirez       -DM nephropathy on ACE  -no DM neuropathy   -follows with opthalmology yearly.     RTC in 3-4 months             "

## 2024-09-19 DIAGNOSIS — E11.9 DIABETES MELLITUS TYPE 2 WITHOUT RETINOPATHY (MULTI): ICD-10-CM

## 2024-09-19 RX ORDER — DULAGLUTIDE 3 MG/.5ML
3 INJECTION, SOLUTION SUBCUTANEOUS
Qty: 6 ML | Refills: 1 | Status: SHIPPED | OUTPATIENT
Start: 2024-09-19

## 2024-10-23 DIAGNOSIS — K21.9 GASTROESOPHAGEAL REFLUX DISEASE WITHOUT ESOPHAGITIS: ICD-10-CM

## 2024-10-23 DIAGNOSIS — I10 PRIMARY HYPERTENSION: ICD-10-CM

## 2024-10-23 DIAGNOSIS — E78.5 DYSLIPIDEMIA, GOAL LDL BELOW 70: ICD-10-CM

## 2024-10-24 RX ORDER — SIMVASTATIN 80 MG/1
80 TABLET, FILM COATED ORAL NIGHTLY
Qty: 90 TABLET | Refills: 3 | Status: SHIPPED | OUTPATIENT
Start: 2024-10-24 | End: 2025-10-24

## 2024-10-24 RX ORDER — FAMOTIDINE 40 MG/1
40 TABLET, FILM COATED ORAL DAILY PRN
Qty: 90 TABLET | Refills: 3 | Status: SHIPPED | OUTPATIENT
Start: 2024-10-24 | End: 2025-10-19

## 2024-10-24 RX ORDER — METOPROLOL SUCCINATE 50 MG/1
50 TABLET, EXTENDED RELEASE ORAL DAILY
Qty: 90 TABLET | Refills: 3 | Status: SHIPPED | OUTPATIENT
Start: 2024-10-24

## 2024-11-02 ENCOUNTER — LAB (OUTPATIENT)
Dept: LAB | Facility: LAB | Age: 69
End: 2024-11-02
Payer: MEDICARE

## 2024-11-02 DIAGNOSIS — I10 PRIMARY HYPERTENSION: ICD-10-CM

## 2024-11-02 DIAGNOSIS — E78.5 HYPERLIPIDEMIA, UNSPECIFIED HYPERLIPIDEMIA TYPE: ICD-10-CM

## 2024-11-02 DIAGNOSIS — R35.1 BPH ASSOCIATED WITH NOCTURIA: Chronic | ICD-10-CM

## 2024-11-02 DIAGNOSIS — N40.1 BPH ASSOCIATED WITH NOCTURIA: Chronic | ICD-10-CM

## 2024-11-02 LAB
ALT SERPL W P-5'-P-CCNC: 15 U/L (ref 10–52)
CHOLEST SERPL-MCNC: 179 MG/DL (ref 0–199)
CHOLESTEROL/HDL RATIO: 4.9
HDLC SERPL-MCNC: 36.6 MG/DL
LDLC SERPL CALC-MCNC: 111 MG/DL
NON HDL CHOLESTEROL: 142 MG/DL (ref 0–149)
PSA SERPL-MCNC: 1.21 NG/ML
TRIGL SERPL-MCNC: 156 MG/DL (ref 0–149)
TSH SERPL-ACNC: 1.96 MIU/L (ref 0.44–3.98)
VLDL: 31 MG/DL (ref 0–40)

## 2024-11-02 PROCEDURE — 80061 LIPID PANEL: CPT

## 2024-11-02 PROCEDURE — 84153 ASSAY OF PSA TOTAL: CPT

## 2024-11-02 PROCEDURE — 84443 ASSAY THYROID STIM HORMONE: CPT

## 2024-11-02 PROCEDURE — 36415 COLL VENOUS BLD VENIPUNCTURE: CPT

## 2024-11-02 PROCEDURE — 84460 ALANINE AMINO (ALT) (SGPT): CPT

## 2024-11-06 ENCOUNTER — APPOINTMENT (OUTPATIENT)
Dept: PRIMARY CARE | Facility: CLINIC | Age: 69
End: 2024-11-06
Payer: MEDICARE

## 2024-11-06 VITALS
DIASTOLIC BLOOD PRESSURE: 82 MMHG | HEIGHT: 71 IN | HEART RATE: 80 BPM | OXYGEN SATURATION: 98 % | BODY MASS INDEX: 26.6 KG/M2 | SYSTOLIC BLOOD PRESSURE: 124 MMHG | WEIGHT: 190 LBS

## 2024-11-06 DIAGNOSIS — I10 PRIMARY HYPERTENSION: ICD-10-CM

## 2024-11-06 DIAGNOSIS — Z00.00 ROUTINE GENERAL MEDICAL EXAMINATION AT HEALTH CARE FACILITY: ICD-10-CM

## 2024-11-06 DIAGNOSIS — Z23 FLU VACCINE NEED: ICD-10-CM

## 2024-11-06 PROCEDURE — 3008F BODY MASS INDEX DOCD: CPT | Performed by: INTERNAL MEDICINE

## 2024-11-06 PROCEDURE — 99214 OFFICE O/P EST MOD 30 MIN: CPT | Performed by: INTERNAL MEDICINE

## 2024-11-06 PROCEDURE — 3074F SYST BP LT 130 MM HG: CPT | Performed by: INTERNAL MEDICINE

## 2024-11-06 PROCEDURE — 1036F TOBACCO NON-USER: CPT | Performed by: INTERNAL MEDICINE

## 2024-11-06 PROCEDURE — 3079F DIAST BP 80-89 MM HG: CPT | Performed by: INTERNAL MEDICINE

## 2024-11-06 PROCEDURE — G2211 COMPLEX E/M VISIT ADD ON: HCPCS | Performed by: INTERNAL MEDICINE

## 2024-11-06 PROCEDURE — 90662 IIV NO PRSV INCREASED AG IM: CPT | Performed by: INTERNAL MEDICINE

## 2024-11-06 PROCEDURE — 1159F MED LIST DOCD IN RCRD: CPT | Performed by: INTERNAL MEDICINE

## 2024-11-06 PROCEDURE — 1123F ACP DISCUSS/DSCN MKR DOCD: CPT | Performed by: INTERNAL MEDICINE

## 2024-11-06 PROCEDURE — 3049F LDL-C 100-129 MG/DL: CPT | Performed by: INTERNAL MEDICINE

## 2024-11-06 PROCEDURE — G0008 ADMIN INFLUENZA VIRUS VAC: HCPCS | Performed by: INTERNAL MEDICINE

## 2024-11-06 PROCEDURE — 1160F RVW MEDS BY RX/DR IN RCRD: CPT | Performed by: INTERNAL MEDICINE

## 2024-11-06 RX ORDER — METOPROLOL SUCCINATE 50 MG/1
50 TABLET, EXTENDED RELEASE ORAL DAILY
Qty: 90 TABLET | Refills: 3 | Status: SHIPPED | OUTPATIENT
Start: 2024-11-06

## 2024-11-06 NOTE — PROGRESS NOTES
"Subjective   Patient ID: Daphne Andrew is a 69 y.o. male who presents for Follow-up.    Here for follow-up  Overall doing well.  No illnesses or injuries.  He has had a bit more loose stools since increasing the Trulicity         Review of Systems    Objective   /82   Pulse 80   Ht 1.803 m (5' 11\")   Wt 86.2 kg (190 lb)   SpO2 98%   BMI 26.50 kg/m²     Physical Exam  Constitutional:       Appearance: Normal appearance.   HENT:      Head: Normocephalic and atraumatic.      Nose: Nose normal.   Eyes:      General: No scleral icterus.     Extraocular Movements: Extraocular movements intact.      Conjunctiva/sclera: Conjunctivae normal.      Pupils: Pupils are equal, round, and reactive to light.   Cardiovascular:      Rate and Rhythm: Normal rate and regular rhythm.      Pulses: Normal pulses.      Heart sounds: Murmur heard.   Pulmonary:      Effort: Pulmonary effort is normal. No respiratory distress.      Breath sounds: Normal breath sounds. No stridor. No wheezing.   Abdominal:      General: Abdomen is flat. Bowel sounds are normal. There is no distension.      Palpations: Abdomen is soft. There is no mass.      Tenderness: There is no abdominal tenderness. There is no guarding.   Musculoskeletal:         General: No swelling, tenderness or deformity. Normal range of motion.      Cervical back: Normal range of motion and neck supple. No tenderness.   Lymphadenopathy:      Cervical: No cervical adenopathy.   Skin:     General: Skin is warm and dry.      Findings: No lesion or rash.   Neurological:      General: No focal deficit present.      Mental Status: He is alert and oriented to person, place, and time.      Cranial Nerves: No cranial nerve deficit.      Motor: No weakness.   Psychiatric:         Mood and Affect: Mood normal.         Behavior: Behavior normal.         Thought Content: Thought content normal.         Judgment: Judgment normal.         Assessment/Plan   Problem List Items Addressed " This Visit             ICD-10-CM    Hypertension I10    Relevant Medications    metoprolol succinate XL (Toprol-XL) 50 mg 24 hr tablet     Other Visit Diagnoses         Codes    Routine general medical examination at health care facility     Z00.00    Flu vaccine need     Z23    Relevant Orders    Flu vaccine, trivalent, preservative free, HIGH-DOSE, age 65y+ (Fluzone) (Completed)          Portions of this encounter note have been copied from my previous note dated 4/24/24  , which have been updated where appropriate and all reflect my current medical decision making from today.      living situation - he lives in a 2 story home, w/ bedroom upstairs. His home in [place] has 5 stories, now w/ an elevator.  He still has siblings and cousins in [place].-He tries to visit each summer though he was not able to go in the summer 2024        Labile insulin-dependent diabetes/elevated weight-obviously weight loss important. He will follow-up with endocrinology.  Presently using the [employer] 6 continuous glucose monitoring-improved control with better diet selections with immediate feedback            Met with Dr. Reid in early Oct.  Continuous glucose monitor helpful.              A1c 6.5% February 2024-a bit elevated from his October 2023 A1c when it was 6.2%.  He will continue his diabetic plan and endocrine follow-up visits as scheduled              9/24-A1c improved 6.9%.  He remains on the Dexcom CGM along with the Trulicity.  Next appointment January 2025    elevated weight/dyslipidemia- he will continue his statin and weight loss efforts. HDL 41, LDL 97, total cholesterol 163 July '22.              11/24-.  BMI 26.5     Hypertension/edema-he will continue his medication low-salt diet and weight loss efforts             10/23-systolic pressure elevated.  We will start low-dose metoprolol and review with his nephrologist at his appointment 11/1/2023.             11/24-blood pressure improved on recheck.   Metoprolol refilled     Moderate aortic stenosis- noted on echo '17; 2 year follow-up echo 11/19 stable           4/24-echo completed. Will plan to recheck in 12 months, 4/25        FSGS secondary- proteinuria- -status post renal biopsy-he will follow-up with Dr. Richard Pierre; recent visit OK he notes. No edema today.    Appointment in February 2022 with Dr. Richard Pierre- every 6 months- more frequent w/ renal lab concerns. Creatinine 1.9 in August 2021.             February 2023-creatinine 2.14.  He will continue to see him every 6 months; appointment 11/1/2023 11/24-he states that he will be evaluated next week at the [facility] for kidney transplant.  He also discussed peritoneal dialysis at home-he will continue to follow with his nephrologist    Hyperparathyroidism-secondary-he will continue to manage with his nephrologist    Vitamin D deficiency- encouraged patient to continue vitamin D daily as ordered. Will consider vitamin D level regularly.      Factor V Leiden homozygous trait-discovered after his son's death from DVTs and PE-coronary requested the entire family be screened for hereditary thrombophilia-patient saw Dr. Hutchinson 11/22-further testing revealed homozygous trait for factor V Leiden-baby aspirin daily recommended as well as consideration for anticoagulation after any elective surgeries        BPH/ED/renal cyst-status post urology consultation with Dr. Horne in the past. PSA normal 11/24      Loose bowel movements-occasionally noted-he feels a bit more noticeable with increasing Trulicity.  He will review with his endocrinologist to follow-up     Colon cancer screening- colonoscopy Updated 1/24;  message sent by Secure Chat:   Demond - jennifer double checking. You did a colonoscopy on [facility] on 1/10/24. Benign polyp. Is his next colonoscopy in 10 year? Thanks!    1 min  PG  Demond Arroyo MD  Correct     Colonoscopy 1/24;  next rec'd 10 yrs - 1/34               Bilateral moderate knee  arthritis-noted on x-rays-January 2021- knee pain ongoing especially kneeling on his knees problematic presently.      He has worked with several orthopedist.  Most recently he met with Dr. Martinez who did an injection in the left knee which helped.            10/23-left knee recently injected-improved he will follow-up with Ortho accordingly as needed              4/24 - knees much better after injection in left knee    Right lateral epicondylitis-information sheet provided stretching and brace encouraged to follow-up if not improved     Right foot pain and swelling/Right ankle Charcot deformity-improved wearing a boot earlier in 2019 per Dr. Mayorga/podiatry. He will see her as needed Coincidentally, recurred again months later. His only doing better. Dr. Diane Mayorga/podiatry. He will elevate the foot continue limited activity and follow-up with her if symptoms persist               Improved presently     Epistaxis - improved     Obstructive sleep apnea on C Pap - He'll f/u w/ Dr. Johnson in pulmonary as directed. He admits he only does this about 2 nights/ wk.         He's not really using this at all. Encouraged follow-up.  He will meet with the sleep team     Acid reflux- . He's found esomeprazole helpful. However at this point he stopped this and presently uses Tums occasionally   Improved presently.              Reflux has been a bit of an issue.  He will use famotidine as needed.        Seasonal allergies-he will continue seasonally as needed     Dental visit-encouraged semiannually.      Cataracts / Vision care-he will continue annually with eye clinic -last vision appointment Sep '21 per Dr. Beltran- he retired             He will establish with a new eye doctor at our eye clinic to check cataracts and do annual diabetic eye exam             Next appointment with Dr. Cooper January 2025                 bereavement - Patient lost his youngest son on 6/5/22-bilateral DVTs and PE.  After further  hematology testing of the whole family, it turns out that Coni was  homozygous for the factor V Leiden trait.     Flu shot encouraged each fall - updated 11/6/24 - today     Prevnar 13-updated 4/12/2023     discussed Shingrix / new shingles shot - 2 shot series w/ limited availability. Encouraged patient to consider getting this when/ where available.                4/24-slip provided again              11/24-he opted out of this    RSV vaccination-encourage 4/24     Follow-up in semiannually, sooner as needed      Charting was completed using voice recognition technology and may include unintended errors.

## 2024-11-18 ENCOUNTER — TELEPHONE (OUTPATIENT)
Dept: PRIMARY CARE | Facility: CLINIC | Age: 69
End: 2024-11-18
Payer: MEDICARE

## 2024-11-18 NOTE — TELEPHONE ENCOUNTER
Patient is on the kidney transplant list, and patient's wife was told that patient needs to get the Hepatitis B vaccination. Please advise if this is correct.

## 2024-11-21 DIAGNOSIS — N05.1 FSGS (FOCAL SEGMENTAL GLOMERULOSCLEROSIS): Primary | ICD-10-CM

## 2024-11-27 ENCOUNTER — APPOINTMENT (OUTPATIENT)
Dept: PRIMARY CARE | Facility: CLINIC | Age: 69
End: 2024-11-27
Payer: MEDICARE

## 2024-11-27 DIAGNOSIS — Z23 ENCOUNTER FOR IMMUNIZATION: Primary | ICD-10-CM

## 2024-11-27 PROCEDURE — 90747 HEPB VACC 4 DOSE IMMUNSUP IM: CPT | Performed by: INTERNAL MEDICINE

## 2024-11-27 PROCEDURE — G0010 ADMIN HEPATITIS B VACCINE: HCPCS | Performed by: INTERNAL MEDICINE

## 2024-12-18 ENCOUNTER — APPOINTMENT (OUTPATIENT)
Dept: PRIMARY CARE | Facility: CLINIC | Age: 69
End: 2024-12-18
Payer: MEDICARE

## 2024-12-18 ENCOUNTER — CLINICAL SUPPORT (OUTPATIENT)
Dept: PRIMARY CARE | Facility: CLINIC | Age: 69
End: 2024-12-18
Payer: MEDICARE

## 2024-12-18 DIAGNOSIS — Z23 NEED FOR HEPATITIS B VACCINATION: ICD-10-CM

## 2024-12-18 DIAGNOSIS — Z23 ENCOUNTER FOR IMMUNIZATION: Primary | ICD-10-CM

## 2024-12-18 PROCEDURE — 90746 HEPB VACCINE 3 DOSE ADULT IM: CPT | Performed by: INTERNAL MEDICINE

## 2024-12-18 PROCEDURE — G0010 ADMIN HEPATITIS B VACCINE: HCPCS | Performed by: INTERNAL MEDICINE

## 2025-01-13 ENCOUNTER — APPOINTMENT (OUTPATIENT)
Dept: OPHTHALMOLOGY | Facility: CLINIC | Age: 70
End: 2025-01-13
Payer: MEDICARE

## 2025-01-13 DIAGNOSIS — H52.223 REGULAR ASTIGMATISM OF BOTH EYES: ICD-10-CM

## 2025-01-13 DIAGNOSIS — H52.03 HYPERMETROPIA OF BOTH EYES: Primary | ICD-10-CM

## 2025-01-13 DIAGNOSIS — E11.9 TYPE 2 DIABETES MELLITUS WITHOUT COMPLICATION, WITHOUT LONG-TERM CURRENT USE OF INSULIN (MULTI): ICD-10-CM

## 2025-01-13 DIAGNOSIS — H52.4 PRESBYOPIA: ICD-10-CM

## 2025-01-13 PROCEDURE — 92014 COMPRE OPH EXAM EST PT 1/>: CPT | Performed by: OPTOMETRIST

## 2025-01-13 PROCEDURE — 92015 DETERMINE REFRACTIVE STATE: CPT | Performed by: OPTOMETRIST

## 2025-01-13 RX ORDER — CALCITRIOL 0.25 UG/1
1 CAPSULE ORAL
COMMUNITY
Start: 2024-04-25

## 2025-01-13 ASSESSMENT — VISUAL ACUITY
OD_SC: 20/25-1
METHOD: SNELLEN - LINEAR
OS_SC: 20/25-2

## 2025-01-13 ASSESSMENT — REFRACTION_MANIFEST
OD_AXIS: 170
OS_SPHERE: +0.50
OS_ADD: +2.75
OS_CYLINDER: -0.75
OD_SPHERE: +0.75
OS_AXIS: 010
OD_ADD: +2.75
OD_CYLINDER: -0.75

## 2025-01-13 ASSESSMENT — ENCOUNTER SYMPTOMS
NEUROLOGICAL NEGATIVE: 0
CARDIOVASCULAR NEGATIVE: 0
EYES NEGATIVE: 1
HEMATOLOGIC/LYMPHATIC NEGATIVE: 0
RESPIRATORY NEGATIVE: 0
PSYCHIATRIC NEGATIVE: 0
GASTROINTESTINAL NEGATIVE: 0
MUSCULOSKELETAL NEGATIVE: 0
ENDOCRINE NEGATIVE: 0
ALLERGIC/IMMUNOLOGIC NEGATIVE: 0
CONSTITUTIONAL NEGATIVE: 0

## 2025-01-13 ASSESSMENT — REFRACTION
OS_AXIS: 005
OD_SPHERE: +0.75
OD_CYLINDER: -0.50
OD_AXIS: 170
OD_ADD: +2.75
OS_ADD: +2.75
OS_SPHERE: +0.50
OS_CYLINDER: -0.50

## 2025-01-13 ASSESSMENT — CONF VISUAL FIELD
OS_SUPERIOR_TEMPORAL_RESTRICTION: 0
OD_SUPERIOR_TEMPORAL_RESTRICTION: 0
OD_INFERIOR_TEMPORAL_RESTRICTION: 0
OS_SUPERIOR_NASAL_RESTRICTION: 0
OS_INFERIOR_TEMPORAL_RESTRICTION: 0
METHOD: COUNTING FINGERS
OS_NORMAL: 1
OD_NORMAL: 1
OS_INFERIOR_NASAL_RESTRICTION: 0
OD_SUPERIOR_NASAL_RESTRICTION: 0
OD_INFERIOR_NASAL_RESTRICTION: 0

## 2025-01-13 ASSESSMENT — REFRACTION_WEARINGRX
OD_ADD: +2.75
OD_CYLINDER: -0.50
OS_ADD: +2.75
OS_CYLINDER: -0.50
OD_AXIS: 175
SPECS_TYPE: DNB
OS_AXIS: 180
OD_SPHERE: +0.50
OS_SPHERE: +0.25

## 2025-01-13 ASSESSMENT — CUP TO DISC RATIO
OD_RATIO: 0.3
OS_RATIO: 0.3

## 2025-01-13 ASSESSMENT — TONOMETRY
IOP_METHOD: TONOPEN
OD_IOP_MMHG: 08
OS_IOP_MMHG: 10

## 2025-01-13 ASSESSMENT — EXTERNAL EXAM - LEFT EYE: OS_EXAM: NORMAL

## 2025-01-13 ASSESSMENT — EXTERNAL EXAM - RIGHT EYE: OD_EXAM: NORMAL

## 2025-01-13 ASSESSMENT — SLIT LAMP EXAM - LIDS
COMMENTS: NORMAL
COMMENTS: NORMAL

## 2025-01-13 NOTE — PROGRESS NOTES
Assessment/Plan   Diagnoses and all orders for this visit:  Hypermetropia of both eyes  Regular astigmatism of both eyes  Presbyopia  Pt consents to receiving glasses Rx today. Patient's/guardian's signature obtained to acknowledge and confirm that a paper copy of glasses Rx was given to patient in compliance with [facility] Eyeglass Rule. Electronic copy of Rx will also be available via [employer]/EPIC.   Spec Rx released. Optional to fill as patient not currently having any difficulty. Ok to use OTC readers as desired. Ocular health WNL for age OU. Monitor 1 year. Refraction billed today.    Type 2 diabetes mellitus without complication, without long-term current use of insulin (Multi)  The patient has diabetes without any evidence of retinopathy.  The patient was advised to maintain tight glucose control, tight blood pressure control, and favorable levels of cholesterol, low density lipoprotein, and high density lipoproteins.  Follow up in one year was recommended.

## 2025-01-22 ENCOUNTER — APPOINTMENT (OUTPATIENT)
Dept: ENDOCRINOLOGY | Facility: CLINIC | Age: 70
End: 2025-01-22
Payer: MEDICARE

## 2025-01-22 VITALS
BODY MASS INDEX: 27.02 KG/M2 | DIASTOLIC BLOOD PRESSURE: 80 MMHG | SYSTOLIC BLOOD PRESSURE: 166 MMHG | HEIGHT: 71 IN | WEIGHT: 193 LBS

## 2025-01-22 DIAGNOSIS — E11.9 DIABETES MELLITUS TYPE 2 WITHOUT RETINOPATHY (MULTI): ICD-10-CM

## 2025-01-22 DIAGNOSIS — Z97.8 USES SELF-APPLIED CONTINUOUS GLUCOSE MONITORING DEVICE: Primary | ICD-10-CM

## 2025-01-22 LAB
POC FINGERSTICK BLOOD GLUCOSE: 118 MG/DL (ref 70–100)
POC HEMOGLOBIN A1C: 6.1 % (ref 4.2–6.5)

## 2025-01-22 PROCEDURE — 3077F SYST BP >= 140 MM HG: CPT | Performed by: STUDENT IN AN ORGANIZED HEALTH CARE EDUCATION/TRAINING PROGRAM

## 2025-01-22 PROCEDURE — 95251 CONT GLUC MNTR ANALYSIS I&R: CPT | Performed by: STUDENT IN AN ORGANIZED HEALTH CARE EDUCATION/TRAINING PROGRAM

## 2025-01-22 PROCEDURE — 83036 HEMOGLOBIN GLYCOSYLATED A1C: CPT | Performed by: STUDENT IN AN ORGANIZED HEALTH CARE EDUCATION/TRAINING PROGRAM

## 2025-01-22 PROCEDURE — 3079F DIAST BP 80-89 MM HG: CPT | Performed by: STUDENT IN AN ORGANIZED HEALTH CARE EDUCATION/TRAINING PROGRAM

## 2025-01-22 PROCEDURE — 82962 GLUCOSE BLOOD TEST: CPT | Performed by: STUDENT IN AN ORGANIZED HEALTH CARE EDUCATION/TRAINING PROGRAM

## 2025-01-22 PROCEDURE — 99214 OFFICE O/P EST MOD 30 MIN: CPT | Performed by: STUDENT IN AN ORGANIZED HEALTH CARE EDUCATION/TRAINING PROGRAM

## 2025-01-22 PROCEDURE — 1123F ACP DISCUSS/DSCN MKR DOCD: CPT | Performed by: STUDENT IN AN ORGANIZED HEALTH CARE EDUCATION/TRAINING PROGRAM

## 2025-01-22 PROCEDURE — G2211 COMPLEX E/M VISIT ADD ON: HCPCS | Performed by: STUDENT IN AN ORGANIZED HEALTH CARE EDUCATION/TRAINING PROGRAM

## 2025-01-22 PROCEDURE — 3008F BODY MASS INDEX DOCD: CPT | Performed by: STUDENT IN AN ORGANIZED HEALTH CARE EDUCATION/TRAINING PROGRAM

## 2025-01-22 PROCEDURE — 1159F MED LIST DOCD IN RCRD: CPT | Performed by: STUDENT IN AN ORGANIZED HEALTH CARE EDUCATION/TRAINING PROGRAM

## 2025-01-22 RX ORDER — BLOOD-GLUCOSE SENSOR
EACH MISCELLANEOUS
Qty: 9 EACH | Refills: 3 | Status: SHIPPED | OUTPATIENT
Start: 2025-01-22

## 2025-01-22 RX ORDER — DULAGLUTIDE 3 MG/.5ML
3 INJECTION, SOLUTION SUBCUTANEOUS
Qty: 6 ML | Refills: 3 | Status: SHIPPED | OUTPATIENT
Start: 2025-01-22

## 2025-01-22 NOTE — PROGRESS NOTES
"Subjective   Patient ID: Daphne Andrew is a 69 y.o. male who presents for Diabetes (Daphne Andrew is 69 y.o. male who presents fDx type 2 , dx in 2016, Family members =mom, PCP = Dr Irwin, Does not see potiatry, Sees an eye dr,PT has a  Dexcom CGM  testing glucose at least 4x/day due to fluctuating  blood glucose and HYPOGLYCEMIA .compliant WITH DIABETIC REGIME.  pT IS BENEFITING FROM cgm TREATMENT PLAN ) ).  HPI   Lab Results   Component Value Date    HGBA1C 6.1 01/22/2025      The pt is  a 69 y.o. male who presents for for DM follow up  He is doing overall well , has no new concerns   He is now on kidney transplant list  , not on dialysis      Current meds :  Trulicity 3  mg   Not on metformin due to CKD   Advised to discuss SGLT2 with nephrologist      Dexcom downloads reviewed   TIR 86 , high 13 , very high 1 %        Review of Systems    Objective   Physical Exam  Constitutional:       Appearance: Normal appearance.   Cardiovascular:      Rate and Rhythm: Normal rate and regular rhythm.   Pulmonary:      Effort: Pulmonary effort is normal.      Breath sounds: Normal breath sounds.   Neurological:      General: No focal deficit present.      Mental Status: He is alert.      Visit Vitals  /80   Ht 1.803 m (5' 11\")   Wt 87.5 kg (193 lb)   BMI 26.92 kg/m²   Smoking Status Former   BSA 2.09 m²        Assessment/Plan         Well controlled type 2 diabetes with A1c of  6.1 was 6.9  he is  off  metformin and is on GLP1. Mounjaro not covered by insurance       - CKD 4 , DM nephropathy off  ACEHe is currently on transplant list I will defer  decision to start SGLT2 to Dr. Ramirez  -no DM neuropathy   -follows with opthalmology yearly.     Follow up with new endocrinologist in 4 months            "

## 2025-02-13 ENCOUNTER — TELEPHONE (OUTPATIENT)
Dept: PRIMARY CARE | Facility: CLINIC | Age: 70
End: 2025-02-13
Payer: MEDICARE

## 2025-02-13 NOTE — TELEPHONE ENCOUNTER
Pt's wife would like to talk to Dr. Irwin, pt had a double kidney transplant and is doing very well. He had it at the Clinic and is going home tomorrow. Please call pt's spouse

## 2025-02-19 NOTE — TELEPHONE ENCOUNTER
Spoke with spouse.  He has been doing minimally given is going to rehab but also working with a kidney team and the transplant team.  He has another visit tomorrow.  He continues to be in rehab and will continue   Medication accordingly.    So far provided. they will follow-up in clinic as scheduled later this spring

## 2025-05-21 ENCOUNTER — APPOINTMENT (OUTPATIENT)
Dept: PRIMARY CARE | Facility: CLINIC | Age: 70
End: 2025-05-21
Payer: MEDICARE

## 2025-05-22 ENCOUNTER — APPOINTMENT (OUTPATIENT)
Dept: PRIMARY CARE | Facility: CLINIC | Age: 70
End: 2025-05-22
Payer: MEDICARE

## 2025-05-22 VITALS
SYSTOLIC BLOOD PRESSURE: 152 MMHG | WEIGHT: 192.6 LBS | OXYGEN SATURATION: 96 % | BODY MASS INDEX: 27.57 KG/M2 | HEART RATE: 60 BPM | DIASTOLIC BLOOD PRESSURE: 82 MMHG | HEIGHT: 70 IN

## 2025-05-22 DIAGNOSIS — E11.9 DIABETES MELLITUS TYPE 2 WITHOUT RETINOPATHY (MULTI): ICD-10-CM

## 2025-05-22 DIAGNOSIS — K21.9 GASTROESOPHAGEAL REFLUX DISEASE WITHOUT ESOPHAGITIS: ICD-10-CM

## 2025-05-22 DIAGNOSIS — Z00.00 ROUTINE GENERAL MEDICAL EXAMINATION AT HEALTH CARE FACILITY: ICD-10-CM

## 2025-05-22 DIAGNOSIS — Z94.0 H/O KIDNEY TRANSPLANT (HHS-HCC): ICD-10-CM

## 2025-05-22 DIAGNOSIS — Z23 ENCOUNTER FOR IMMUNIZATION: Primary | ICD-10-CM

## 2025-05-22 PROBLEM — N17.9 AKI (ACUTE KIDNEY INJURY): Status: RESOLVED | Noted: 2023-09-05 | Resolved: 2025-05-22

## 2025-05-22 PROBLEM — R19.7 WATERY DIARRHEA: Status: RESOLVED | Noted: 2023-09-05 | Resolved: 2025-05-22

## 2025-05-22 PROBLEM — E87.20 ACIDOSIS, UNSPECIFIED: Status: RESOLVED | Noted: 2023-06-28 | Resolved: 2025-05-22

## 2025-05-22 PROBLEM — L03.115 CELLULITIS OF RIGHT LOWER EXTREMITY: Status: RESOLVED | Noted: 2023-04-11 | Resolved: 2025-05-22

## 2025-05-22 PROBLEM — N18.30 STAGE 3 CHRONIC KIDNEY DISEASE (MULTI): Status: RESOLVED | Noted: 2023-04-13 | Resolved: 2025-05-22

## 2025-05-22 RX ORDER — TACROLIMUS 1 MG/1
1 CAPSULE ORAL 2 TIMES DAILY
COMMUNITY
Start: 2025-02-13

## 2025-05-22 RX ORDER — INSULIN GLARGINE 100 [IU]/ML
25 INJECTION, SOLUTION SUBCUTANEOUS EVERY MORNING
COMMUNITY
Start: 2025-03-17

## 2025-05-22 RX ORDER — ACETAMINOPHEN 500 MG
1000 TABLET ORAL 4 TIMES DAILY
COMMUNITY
Start: 2025-02-13

## 2025-05-22 RX ORDER — MYCOPHENOLATE MOFETIL 250 MG/1
500 CAPSULE ORAL 2 TIMES DAILY
COMMUNITY
Start: 2025-02-13

## 2025-05-22 RX ORDER — PREDNISONE 5 MG/1
5 TABLET ORAL
COMMUNITY
Start: 2025-02-13

## 2025-05-22 RX ORDER — CARVEDILOL 25 MG/1
25 TABLET ORAL 2 TIMES DAILY
COMMUNITY
Start: 2025-03-25

## 2025-05-22 RX ORDER — ISOPROPYL ALCOHOL 70 ML/100ML
SWAB TOPICAL
COMMUNITY
Start: 2025-05-11

## 2025-05-22 RX ORDER — VALGANCICLOVIR 450 MG/1
900 TABLET, FILM COATED ORAL
COMMUNITY
Start: 2025-04-04

## 2025-05-22 RX ORDER — FAMOTIDINE 10 MG/1
40 TABLET ORAL 2 TIMES DAILY PRN
Qty: 90 TABLET | Refills: 3 | Status: SHIPPED | OUTPATIENT
Start: 2025-05-22 | End: 2025-05-22 | Stop reason: DRUGHIGH

## 2025-05-22 RX ORDER — GLECAPREVIR AND PIBRENTASVIR 40; 100 MG/1; MG/1
3 TABLET, FILM COATED ORAL DAILY
COMMUNITY

## 2025-05-22 RX ORDER — ENTECAVIR 0.5 MG/1
0.5 TABLET, FILM COATED ORAL
COMMUNITY
Start: 2025-02-19

## 2025-05-22 RX ORDER — INSULIN LISPRO 100 [IU]/ML
INJECTION, SOLUTION INTRAVENOUS; SUBCUTANEOUS
COMMUNITY
Start: 2025-03-17

## 2025-05-22 RX ORDER — FAMOTIDINE 10 MG/1
10 TABLET ORAL 2 TIMES DAILY PRN
Qty: 90 TABLET | Refills: 3 | Status: SHIPPED | OUTPATIENT
Start: 2025-05-22 | End: 2026-05-17

## 2025-05-22 RX ORDER — LOSARTAN POTASSIUM 25 MG/1
1 TABLET ORAL NIGHTLY
COMMUNITY
Start: 2025-04-04 | End: 2026-04-04

## 2025-05-22 RX ORDER — DULAGLUTIDE 3 MG/.5ML
1.5 INJECTION, SOLUTION SUBCUTANEOUS
Status: SHIPPED | COMMUNITY
Start: 2025-05-22

## 2025-05-22 RX ORDER — ATORVASTATIN CALCIUM 80 MG/1
80 TABLET, FILM COATED ORAL
COMMUNITY
Start: 2025-02-13

## 2025-05-22 RX ORDER — SULFAMETHOXAZOLE AND TRIMETHOPRIM 400; 80 MG/1; MG/1
1 TABLET ORAL DAILY
COMMUNITY

## 2025-05-22 ASSESSMENT — ACTIVITIES OF DAILY LIVING (ADL)
ASSISTIVE_DEVICE: EYEGLASSES
DOING_HOUSEWORK: INDEPENDENT
FEEDING YOURSELF: INDEPENDENT
DRESSING: INDEPENDENT
GROOMING: INDEPENDENT
ADEQUATE_TO_COMPLETE_ADL: YES
GROCERY_SHOPPING: INDEPENDENT
TOILETING: INDEPENDENT
JUDGMENT_ADEQUATE_SAFELY_COMPLETE_DAILY_ACTIVITIES: YES
TAKING_MEDICATION: INDEPENDENT
BATHING: INDEPENDENT
PATIENT'S MEMORY ADEQUATE TO SAFELY COMPLETE DAILY ACTIVITIES?: YES
MANAGING_FINANCES: INDEPENDENT

## 2025-05-22 NOTE — PROGRESS NOTES
"Subjective   Reason for Visit: Daphne Andrew is an 69 y.o. male here for a Medicare Wellness visit.     Past Medical, Surgical, and Family History reviewed and updated in chart.    Reviewed all medications by prescribing practitioner or clinical pharmacist (such as prescriptions, OTCs, herbal therapies and supplements) and documented in the medical record.    Here for follow up and wellness visit.  Overall doing well for the most part.    Remarkably doing well since his transplant in January at the [facility].  He remains with the transplant team  He notes his blood pressure has been high.  Presently undergoing treatment for hepatitis C as his transplanted kidney has had this infection.  He is temporarily off the atorvastatin while on the treatment    Feels well without fevers chills illnesses        Patient Care Team:  Cyril Irwin MD as PCP - General  Cyril Irwin MD as PCP - O Medicare Advantage PCP     Review of Systems    Objective   Vitals:  /82   Pulse 60   Ht 1.769 m (5' 9.65\")   Wt 87.4 kg (192 lb 9.6 oz)   SpO2 96%   BMI 27.92 kg/m²       Physical Exam  Vitals reviewed.   Constitutional:       Appearance: Normal appearance.   HENT:      Head: Normocephalic and atraumatic.   Eyes:      General: No scleral icterus.        Right eye: No discharge.         Left eye: No discharge.      Extraocular Movements: Extraocular movements intact.      Conjunctiva/sclera: Conjunctivae normal.      Pupils: Pupils are equal, round, and reactive to light.   Cardiovascular:      Rate and Rhythm: Normal rate and regular rhythm.      Pulses: Normal pulses.      Heart sounds: Murmur heard.   Pulmonary:      Effort: Pulmonary effort is normal.      Breath sounds: Normal breath sounds. No wheezing or rhonchi.   Abdominal:      Comments: No abdominal pain on palpation   Musculoskeletal:         General: No deformity or signs of injury. Normal range of motion.      Cervical back: Normal range of motion and neck " supple. No rigidity or tenderness.   Lymphadenopathy:      Cervical: No cervical adenopathy.   Skin:     General: Skin is warm and dry.      Findings: No rash.   Neurological:      General: No focal deficit present.      Mental Status: He is alert and oriented to person, place, and time. Mental status is at baseline.      Cranial Nerves: No cranial nerve deficit.      Sensory: No sensory deficit.      Gait: Gait normal.   Psychiatric:         Mood and Affect: Mood normal.         Behavior: Behavior normal.         Thought Content: Thought content normal.         Judgment: Judgment normal.         Assessment & Plan  Encounter for immunization         Diabetes mellitus type 2 without retinopathy (Multi)         Gastroesophageal reflux disease without esophagitis    Orders:    famotidine (Pepcid) 10 mg tablet; Take 1 tablet (10 mg) by mouth 2 times a day as needed for heartburn.    H/O kidney transplant ([facility])              Portions of this encounter note have been copied from my previous note dated 11/6/24  , which have been updated where appropriate and all reflect my current medical decision making from today.      living situation - he lives in a 2 story home, w/ bedroom upstairs. His home in [place] has 5 stories, now w/ an elevator.  He still has siblings and cousins in [place].-He tries to visit each summer though he was not able to go in the summer 2024    S/p renal transplant January 2025 at the [facility]-           5/25-he remains under the care of the transplant clinic for essentially all issues at least for the first year.  He will follow-up accordingly.  I asked him to contact the transplant clinic regarding additional blood pressure medicine, his third hepatitis B vaccination which is due at this time.  He anticipates routine renal biopsy per protocol soon    Hepatitis C-he will continue treatment plan per transplant team        Labile insulin-dependent diabetes/elevated weight-obviously  weight loss important. He will follow-up with endocrinology.  Presently using the Dexicon 6 continuous glucose monitoring-improved control with better diet selections with immediate feedback            Met with Dr. Reid in early Oct.  Continuous glucose monitor helpful.              A1c 6.5% February 2024-a bit elevated from his October 2023 A1c when it was 6.2%.  He will continue his diabetic plan and endocrine follow-up visits as scheduled              9/24-A1c improved 6.9%.  He remains on the Dexcom CGM along with the Trulicity.  Next appointment January 2025 5/25-glucose values much improved with Dexcom management.  He will continue with his endocrinology team      elevated weight/dyslipidemia- he will continue his statin and weight loss efforts. HDL 41, LDL 97, total cholesterol 163 July '22.              11/24-.  BMI 26.5               5/25-BMI 27.9.  His weight decreased a lot after surgery but he has had a better appetite and this is improved since then     Hypertension/edema-he will continue his medication low-salt diet and weight loss efforts             10/23-systolic pressure elevated.  We will start low-dose metoprolol and review with his nephrologist at his appointment 11/1/2023.             11/24-blood pressure improved on recheck.  Metoprolol refilled             5/25-he is on carvedilol 25 twice daily as well as losartan with elevated blood pressure at 150 systolic today.  Initially 160/104.  He notes the amlodipine 5 mg daily was helpful before.  Is not clear whether this would be able to be used with his present medication regiment considering side effects and drug interactions.  He will check with his transplant team accordingly     Moderate aortic stenosis- noted on echo '17; 2 year follow-up echo 11/19 stable           4/24-echo completed. Will plan to recheck in 12 months, 4/25        FSGS secondary- proteinuria- -status post renal biopsy-he will follow-up with Dr. Ramos  Ignacio; recent visit OK he notes. No edema today.    Appointment in February 2022 with Dr. Richard Pierre- every 6 months- more frequent w/ renal lab concerns. Creatinine 1.9 in August 2021.             February 2023-creatinine 2.14.  He will continue to see him every 6 months; appointment 11/1/2023 11/24-he states that he will be evaluated next week at the [facility] for kidney transplant.  He also discussed peritoneal dialysis at home-he will continue to follow with his nephrologist               5/25-remarkably he got his transplanted kidneys in January 2025 within 4 weeks on the transplant list.  He will continue nephrology care with the transplant team for 12 months and then [place] back with Dr. Richard Pierre after that    Hyperparathyroidism-secondary-he will continue to manage with his nephrologist    Vitamin D deficiency- encouraged patient to continue vitamin D daily as ordered. Will consider vitamin D level regularly.      Factor V Leiden homozygous trait-discovered after his son's death from DVTs and PE-coronary requested the entire family be screened for hereditary thrombophilia-patient saw Dr. Hutchinson 11/22-further testing revealed homozygous trait for factor V Leiden-baby aspirin daily recommended as well as consideration for anticoagulation after any elective surgeries        BPH/ED/renal cyst-status post urology consultation with Dr. Horne in the past. PSA normal 11/24      Loose bowel movements-occasionally noted-he feels a bit more noticeable with increasing Trulicity.  He will review with his endocrinologist to follow-up     Colon cancer screening- colonoscopy Updated 1/24;  message sent by Secure Chat:   Demond - just double checking. You did a colonoscopy on [facility] on 1/10/24. Benign polyp. Is his next colonoscopy in 10 year? Thanks!    1 min  PG  Demond Arroyo MD  Correct     Colonoscopy 1/24;  next rec'd 10 yrs - 1/34               Bilateral moderate knee arthritis-noted on  x-rays-January 2021- knee pain ongoing especially kneeling on his knees problematic presently.      He has worked with several orthopedist.  Most recently he met with Dr. Martinez who did an injection in the left knee which helped.            10/23-left knee recently injected-improved he will follow-up with Ortho accordingly as needed              4/24 - knees much better after injection in left knee    Right lateral epicondylitis-information sheet provided stretching and brace encouraged to follow-up if not improved     Right foot pain and swelling/Right ankle Charcot deformity-improved wearing a boot earlier in 2019 per Dr. Mayorga/podiatry. He will see her as needed Coincidentally, recurred again months later. His only doing better. Dr. Diane Mayorga/podiatry. He will elevate the foot continue limited activity and follow-up with her if symptoms persist               Improved presently     Epistaxis - improved     Obstructive sleep apnea on C Pap - He'll f/u w/ Dr. Johnson in pulmonary as directed. He admits he only does this about 2 nights/ wk.         He's not really using this at all. Encouraged follow-up.  He will meet with the sleep team     Acid reflux- . He's found esomeprazole helpful. However at this point he stopped this and presently uses Tums occasionally   Improved presently.              Reflux has been a bit of an issue.  He will use famotidine as needed.        Seasonal allergies-he will continue seasonally as needed     Dental visit-encouraged semiannually.      Cataracts / Vision care-he will continue annually with eye clinic -last vision appointment Sep '21 per Dr. Beltran- he retired             He will establish with a new eye doctor at our eye clinic to check cataracts and do annual diabetic eye exam             Next appointment with Dr. Cooper January 2025                 bereavement - Patient lost his youngest son on 6/5/22-bilateral DVTs and PE.  After further hematology testing of the  whole family, it turns out that Coni was  homozygous for the factor V Leiden trait.     Flu shot encouraged each fall - updated 11/6/24      Prevnar 13-updated 4/12/2023     discussed Shingrix / new shingles shot - 2 shot series w/ limited availability. Encouraged patient to consider getting this when/ where available.                4/24-slip provided again              11/24-he opted out of this    RSV vaccination-encourage 4/24     Follow-up in semiannually, sooner as needed      Charting was completed using voice recognition technology and may include unintended errors.

## 2025-05-22 NOTE — ASSESSMENT & PLAN NOTE
Orders:    famotidine (Pepcid) 10 mg tablet; Take 1 tablet (10 mg) by mouth 2 times a day as needed for heartburn.

## 2025-08-14 ENCOUNTER — APPOINTMENT (OUTPATIENT)
Facility: CLINIC | Age: 70
End: 2025-08-14
Payer: MEDICARE

## 2025-11-11 ENCOUNTER — APPOINTMENT (OUTPATIENT)
Dept: PRIMARY CARE | Facility: CLINIC | Age: 70
End: 2025-11-11
Payer: MEDICARE

## 2026-01-29 ENCOUNTER — APPOINTMENT (OUTPATIENT)
Dept: OPHTHALMOLOGY | Facility: CLINIC | Age: 71
End: 2026-01-29
Payer: MEDICARE

## 2026-02-05 ENCOUNTER — APPOINTMENT (OUTPATIENT)
Facility: CLINIC | Age: 71
End: 2026-02-05
Payer: MEDICARE

## 2026-05-19 ENCOUNTER — APPOINTMENT (OUTPATIENT)
Dept: PRIMARY CARE | Facility: CLINIC | Age: 71
End: 2026-05-19
Payer: MEDICARE